# Patient Record
Sex: MALE | Employment: FULL TIME
[De-identification: names, ages, dates, MRNs, and addresses within clinical notes are randomized per-mention and may not be internally consistent; named-entity substitution may affect disease eponyms.]

---

## 2017-09-07 ENCOUNTER — CHARTING TRANS (OUTPATIENT)
Dept: OTHER | Age: 52
End: 2017-09-07

## 2017-09-07 ENCOUNTER — HOSPITAL (OUTPATIENT)
Dept: OTHER | Age: 52
End: 2017-09-07
Attending: EMERGENCY MEDICINE

## 2017-09-07 ENCOUNTER — DIAGNOSTIC TRANS (OUTPATIENT)
Dept: OTHER | Age: 52
End: 2017-09-07

## 2017-09-07 LAB
ALBUMIN SERPL-MCNC: 3.9 GM/DL (ref 3.6–5.1)
ALBUMIN/GLOB SERPL: 1.1 {RATIO} (ref 1–2.4)
ALP SERPL-CCNC: 42 UNIT/L (ref 45–117)
ALT SERPL-CCNC: 32 UNIT/L
ANALYZER ANC (IANC): NORMAL
ANION GAP SERPL CALC-SCNC: 13 MMOL/L (ref 10–20)
AST SERPL-CCNC: 24 UNIT/L
BASOPHILS # BLD: 0 THOUSAND/MCL (ref 0–0.3)
BASOPHILS NFR BLD: 1 %
BILIRUB SERPL-MCNC: 0.5 MG/DL (ref 0.2–1)
BUN SERPL-MCNC: 20 MG/DL (ref 6–20)
BUN/CREAT SERPL: 19 (ref 7–25)
CALCIUM SERPL-MCNC: 8.6 MG/DL (ref 8.4–10.2)
CHLORIDE: 108 MMOL/L (ref 98–107)
CO2 SERPL-SCNC: 26 MMOL/L (ref 21–32)
CREAT SERPL-MCNC: 1.03 MG/DL (ref 0.67–1.17)
D DIMER PPP FEU-MCNC: <0.19 MG/L FEU
DIFFERENTIAL METHOD BLD: NORMAL
EOSINOPHIL # BLD: 0.3 THOUSAND/MCL (ref 0.1–0.5)
EOSINOPHIL NFR BLD: 5 %
ERYTHROCYTE [DISTWIDTH] IN BLOOD: 12.6 % (ref 11–15)
GLOBULIN SER-MCNC: 3.6 GM/DL (ref 2–4)
GLUCOSE SERPL-MCNC: 100 MG/DL (ref 65–99)
HEMATOCRIT: 44 % (ref 39–51)
HGB BLD-MCNC: 15.2 GM/DL (ref 13–17)
LIPASE SERPL-CCNC: 203 UNIT/L (ref 73–393)
LYMPHOCYTES # BLD: 2 THOUSAND/MCL (ref 1–4.8)
LYMPHOCYTES NFR BLD: 32 %
MCH RBC QN AUTO: 32 PG (ref 26–34)
MCHC RBC AUTO-ENTMCNC: 34.5 GM/DL (ref 32–36.5)
MCV RBC AUTO: 92.6 FL (ref 78–100)
MONOCYTES # BLD: 0.8 THOUSAND/MCL (ref 0.3–0.9)
MONOCYTES NFR BLD: 13 %
NEUTROPHILS # BLD: 3 THOUSAND/MCL (ref 1.8–7.7)
NEUTROPHILS NFR BLD: 49 %
NEUTS SEG NFR BLD: NORMAL %
PERCENT NRBC: NORMAL
PLATELET # BLD: 185 THOUSAND/MCL (ref 140–450)
POTASSIUM SERPL-SCNC: 4.1 MMOL/L (ref 3.4–5.1)
PROT SERPL-MCNC: 7.5 GM/DL (ref 6.4–8.2)
RBC # BLD: 4.75 MILLION/MCL (ref 4.5–5.9)
SODIUM SERPL-SCNC: 143 MMOL/L (ref 135–145)
TROPONIN I SERPL HS-MCNC: <0.02 NG/ML
WBC # BLD: 6.1 THOUSAND/MCL (ref 4.2–11)

## 2017-11-20 RX ORDER — BIOTIN 1 MG
1 TABLET ORAL DAILY
COMMUNITY

## 2017-11-20 RX ORDER — MULTIVIT-MIN/IRON FUM/FOLIC AC 7.5 MG-4
1 TABLET ORAL DAILY
COMMUNITY

## 2017-11-20 RX ORDER — ASCORBIC ACID 500 MG
500 TABLET ORAL DAILY
COMMUNITY

## 2017-11-22 NOTE — H&P
ORTHO SURGERY H&P  Azell Landau is a 50year old male. MRN is [id number]. Admitted: (Not on file)    CC: Right knee instability    HPI: Mr. Radha Thakkar is a 50year old male who presents complaining of right knee instability symptoms.  He reports ongoing symptoms smoked. He reports occasional alcohol use. He consumes alcohol socially. He denies any intravenous, illicit, or recreational drug use. Family History: Non-contributory. Allergies: NKDA. Report latex allergy.      Medications:     Menaquinone 7 (Vit consideration of revision ACL reconstruction with an allograft bone patellar tendon bone graft. He would also augment the anterolateral capsule with an anterolateral ligament/oblique ligament reconstruction. Risks and benefits of right knee arthroscopy, re

## 2017-11-27 ENCOUNTER — ANESTHESIA EVENT (OUTPATIENT)
Dept: SURGERY | Facility: HOSPITAL | Age: 52
End: 2017-11-27
Payer: COMMERCIAL

## 2017-11-27 ENCOUNTER — SURGERY (OUTPATIENT)
Age: 52
End: 2017-11-27

## 2017-11-27 ENCOUNTER — HOSPITAL ENCOUNTER (OUTPATIENT)
Facility: HOSPITAL | Age: 52
Setting detail: HOSPITAL OUTPATIENT SURGERY
Discharge: HOME | End: 2017-11-27
Attending: ORTHOPAEDIC SURGERY | Admitting: ORTHOPAEDIC SURGERY
Payer: COMMERCIAL

## 2017-11-27 ENCOUNTER — APPOINTMENT (OUTPATIENT)
Dept: GENERAL RADIOLOGY | Facility: HOSPITAL | Age: 52
End: 2017-11-27
Attending: ORTHOPAEDIC SURGERY
Payer: COMMERCIAL

## 2017-11-27 ENCOUNTER — ANESTHESIA (OUTPATIENT)
Dept: SURGERY | Facility: HOSPITAL | Age: 52
End: 2017-11-27
Payer: COMMERCIAL

## 2017-11-27 VITALS
BODY MASS INDEX: 28.95 KG/M2 | TEMPERATURE: 98 F | RESPIRATION RATE: 16 BRPM | HEIGHT: 68 IN | SYSTOLIC BLOOD PRESSURE: 107 MMHG | OXYGEN SATURATION: 100 % | DIASTOLIC BLOOD PRESSURE: 68 MMHG | HEART RATE: 71 BPM | WEIGHT: 191 LBS

## 2017-11-27 PROCEDURE — 0MRN4KZ REPLACEMENT OF RIGHT KNEE BURSA AND LIGAMENT WITH NONAUTOLOGOUS TISSUE SUBSTITUTE, PERCUTANEOUS ENDOSCOPIC APPROACH: ICD-10-PCS | Performed by: ORTHOPAEDIC SURGERY

## 2017-11-27 PROCEDURE — 0SBC4ZZ EXCISION OF RIGHT KNEE JOINT, PERCUTANEOUS ENDOSCOPIC APPROACH: ICD-10-PCS | Performed by: ORTHOPAEDIC SURGERY

## 2017-11-27 PROCEDURE — 76000 FLUOROSCOPY <1 HR PHYS/QHP: CPT | Performed by: ORTHOPAEDIC SURGERY

## 2017-11-27 DEVICE — SCREW ACL BIO COMP AR-1370C: Type: IMPLANTABLE DEVICE | Site: KNEE | Status: FUNCTIONAL

## 2017-11-27 RX ORDER — MORPHINE SULFATE 2 MG/ML
2 INJECTION, SOLUTION INTRAMUSCULAR; INTRAVENOUS EVERY 10 MIN PRN
Status: DISCONTINUED | OUTPATIENT
Start: 2017-11-27 | End: 2017-11-27

## 2017-11-27 RX ORDER — HYDROMORPHONE HYDROCHLORIDE 1 MG/ML
INJECTION, SOLUTION INTRAMUSCULAR; INTRAVENOUS; SUBCUTANEOUS AS NEEDED
Status: DISCONTINUED | OUTPATIENT
Start: 2017-11-27 | End: 2017-11-27 | Stop reason: SURG

## 2017-11-27 RX ORDER — SODIUM CHLORIDE, SODIUM LACTATE, POTASSIUM CHLORIDE, CALCIUM CHLORIDE 600; 310; 30; 20 MG/100ML; MG/100ML; MG/100ML; MG/100ML
INJECTION, SOLUTION INTRAVENOUS CONTINUOUS
Status: DISCONTINUED | OUTPATIENT
Start: 2017-11-27 | End: 2017-11-27

## 2017-11-27 RX ORDER — HALOPERIDOL 5 MG/ML
0.25 INJECTION INTRAMUSCULAR ONCE AS NEEDED
Status: DISCONTINUED | OUTPATIENT
Start: 2017-11-27 | End: 2017-11-27

## 2017-11-27 RX ORDER — MORPHINE SULFATE 4 MG/ML
4 INJECTION, SOLUTION INTRAMUSCULAR; INTRAVENOUS EVERY 10 MIN PRN
Status: DISCONTINUED | OUTPATIENT
Start: 2017-11-27 | End: 2017-11-27

## 2017-11-27 RX ORDER — NALOXONE HYDROCHLORIDE 0.4 MG/ML
80 INJECTION, SOLUTION INTRAMUSCULAR; INTRAVENOUS; SUBCUTANEOUS AS NEEDED
Status: DISCONTINUED | OUTPATIENT
Start: 2017-11-27 | End: 2017-11-27

## 2017-11-27 RX ORDER — FAMOTIDINE 20 MG/1
20 TABLET ORAL ONCE
Status: DISCONTINUED | OUTPATIENT
Start: 2017-11-27 | End: 2017-11-27 | Stop reason: HOSPADM

## 2017-11-27 RX ORDER — MIDAZOLAM HYDROCHLORIDE 1 MG/ML
INJECTION INTRAMUSCULAR; INTRAVENOUS AS NEEDED
Status: DISCONTINUED | OUTPATIENT
Start: 2017-11-27 | End: 2017-11-27 | Stop reason: SURG

## 2017-11-27 RX ORDER — MORPHINE SULFATE 1 MG/ML
INJECTION, SOLUTION EPIDURAL; INTRATHECAL; INTRAVENOUS AS NEEDED
Status: DISCONTINUED | OUTPATIENT
Start: 2017-11-27 | End: 2017-11-27

## 2017-11-27 RX ORDER — ACETAMINOPHEN 500 MG
1000 TABLET ORAL ONCE
Status: DISCONTINUED | OUTPATIENT
Start: 2017-11-27 | End: 2017-11-27 | Stop reason: HOSPADM

## 2017-11-27 RX ORDER — ONDANSETRON 2 MG/ML
INJECTION INTRAMUSCULAR; INTRAVENOUS AS NEEDED
Status: DISCONTINUED | OUTPATIENT
Start: 2017-11-27 | End: 2017-11-27 | Stop reason: SURG

## 2017-11-27 RX ORDER — MORPHINE SULFATE 10 MG/ML
6 INJECTION, SOLUTION INTRAMUSCULAR; INTRAVENOUS EVERY 10 MIN PRN
Status: DISCONTINUED | OUTPATIENT
Start: 2017-11-27 | End: 2017-11-27

## 2017-11-27 RX ORDER — BUPIVACAINE HYDROCHLORIDE AND EPINEPHRINE 5; 5 MG/ML; UG/ML
INJECTION, SOLUTION PERINEURAL AS NEEDED
Status: DISCONTINUED | OUTPATIENT
Start: 2017-11-27 | End: 2017-11-27

## 2017-11-27 RX ORDER — HYDROCODONE BITARTRATE AND ACETAMINOPHEN 5; 325 MG/1; MG/1
1 TABLET ORAL AS NEEDED
Status: DISCONTINUED | OUTPATIENT
Start: 2017-11-27 | End: 2017-11-27

## 2017-11-27 RX ORDER — HYDROMORPHONE HYDROCHLORIDE 1 MG/ML
0.6 INJECTION, SOLUTION INTRAMUSCULAR; INTRAVENOUS; SUBCUTANEOUS EVERY 5 MIN PRN
Status: DISCONTINUED | OUTPATIENT
Start: 2017-11-27 | End: 2017-11-27

## 2017-11-27 RX ORDER — LIDOCAINE HYDROCHLORIDE 10 MG/ML
INJECTION, SOLUTION EPIDURAL; INFILTRATION; INTRACAUDAL; PERINEURAL AS NEEDED
Status: DISCONTINUED | OUTPATIENT
Start: 2017-11-27 | End: 2017-11-27 | Stop reason: SURG

## 2017-11-27 RX ORDER — HYDROCODONE BITARTRATE AND ACETAMINOPHEN 5; 325 MG/1; MG/1
2 TABLET ORAL AS NEEDED
Status: DISCONTINUED | OUTPATIENT
Start: 2017-11-27 | End: 2017-11-27

## 2017-11-27 RX ORDER — HYDROMORPHONE HYDROCHLORIDE 1 MG/ML
0.4 INJECTION, SOLUTION INTRAMUSCULAR; INTRAVENOUS; SUBCUTANEOUS EVERY 5 MIN PRN
Status: DISCONTINUED | OUTPATIENT
Start: 2017-11-27 | End: 2017-11-27

## 2017-11-27 RX ORDER — ONDANSETRON 2 MG/ML
4 INJECTION INTRAMUSCULAR; INTRAVENOUS ONCE AS NEEDED
Status: DISCONTINUED | OUTPATIENT
Start: 2017-11-27 | End: 2017-11-27

## 2017-11-27 RX ORDER — METOCLOPRAMIDE 10 MG/1
10 TABLET ORAL ONCE
Status: DISCONTINUED | OUTPATIENT
Start: 2017-11-27 | End: 2017-11-27 | Stop reason: HOSPADM

## 2017-11-27 RX ORDER — DEXAMETHASONE SODIUM PHOSPHATE 4 MG/ML
VIAL (ML) INJECTION AS NEEDED
Status: DISCONTINUED | OUTPATIENT
Start: 2017-11-27 | End: 2017-11-27 | Stop reason: SURG

## 2017-11-27 RX ORDER — HYDROMORPHONE HYDROCHLORIDE 1 MG/ML
0.2 INJECTION, SOLUTION INTRAMUSCULAR; INTRAVENOUS; SUBCUTANEOUS EVERY 5 MIN PRN
Status: DISCONTINUED | OUTPATIENT
Start: 2017-11-27 | End: 2017-11-27

## 2017-11-27 RX ORDER — HYDROCODONE BITARTRATE AND ACETAMINOPHEN 5; 325 MG/1; MG/1
1 TABLET ORAL EVERY 4 HOURS PRN
Qty: 50 TABLET | Refills: 0 | Status: SHIPPED | OUTPATIENT
Start: 2017-11-27 | End: 2018-02-22

## 2017-11-27 RX ADMIN — HYDROMORPHONE HYDROCHLORIDE 0.25 MG: 1 INJECTION, SOLUTION INTRAMUSCULAR; INTRAVENOUS; SUBCUTANEOUS at 12:10:00

## 2017-11-27 RX ADMIN — SODIUM CHLORIDE, SODIUM LACTATE, POTASSIUM CHLORIDE, CALCIUM CHLORIDE: 600; 310; 30; 20 INJECTION, SOLUTION INTRAVENOUS at 10:45:00

## 2017-11-27 RX ADMIN — LIDOCAINE HYDROCHLORIDE 50 MG: 10 INJECTION, SOLUTION EPIDURAL; INFILTRATION; INTRACAUDAL; PERINEURAL at 09:42:00

## 2017-11-27 RX ADMIN — DEXAMETHASONE SODIUM PHOSPHATE 4 MG: 4 MG/ML VIAL (ML) INJECTION at 09:42:00

## 2017-11-27 RX ADMIN — SODIUM CHLORIDE, SODIUM LACTATE, POTASSIUM CHLORIDE, CALCIUM CHLORIDE: 600; 310; 30; 20 INJECTION, SOLUTION INTRAVENOUS at 11:58:00

## 2017-11-27 RX ADMIN — SODIUM CHLORIDE, SODIUM LACTATE, POTASSIUM CHLORIDE, CALCIUM CHLORIDE: 600; 310; 30; 20 INJECTION, SOLUTION INTRAVENOUS at 09:42:00

## 2017-11-27 RX ADMIN — ONDANSETRON 4 MG: 2 INJECTION INTRAMUSCULAR; INTRAVENOUS at 12:12:00

## 2017-11-27 RX ADMIN — HYDROMORPHONE HYDROCHLORIDE 0.5 MG: 1 INJECTION, SOLUTION INTRAMUSCULAR; INTRAVENOUS; SUBCUTANEOUS at 10:17:00

## 2017-11-27 RX ADMIN — MIDAZOLAM HYDROCHLORIDE 2 MG: 1 INJECTION INTRAMUSCULAR; INTRAVENOUS at 09:42:00

## 2017-11-27 RX ADMIN — HYDROMORPHONE HYDROCHLORIDE 0.25 MG: 1 INJECTION, SOLUTION INTRAMUSCULAR; INTRAVENOUS; SUBCUTANEOUS at 10:30:00

## 2017-11-27 NOTE — ANESTHESIA PREPROCEDURE EVALUATION
Anesthesia PreOp Note    HPI:     Darryle Bracken is a 50year old male who presents for preoperative consultation requested by: Ruddy Rawls MD    Date of Surgery: 11/27/2017    Procedure(s):  KNEE ARTHROSCOPY ACL RECONSTRUCTION  Indication: Right kne reviewed. No pertinent family history.     Social History  Social History   Marital status:   Spouse name: N/A    Years of education: N/A  Number of children: N/A     Occupational History  None on file     Social History Main Topics   Smoking status:

## 2017-11-27 NOTE — OPERATIVE REPORT
[facility]    PATIENT'S NAME: Pamela Buckley   ATTENDING PHYSICIAN: Isai Narayanan MD   OPERATING PHYSICIAN: Isai Narayanan MD   PATIENT ACCOUNT#:   [id number]    LOCATION:  [place] [address]  MEDICAL RECORD #:   [id number]       DATE degenerative change of the femoral trochlea with partial thickness articular cartilage loss and some irregularity fibrillation throughout. The patellar articular surface had grade 1 to grade 2 degenerative change with softening and minimal fibrillation. the ACL remnant was performed with a motorized bur and motorized shaver. A minimal bony notchplasty was performed. We removed approximately 1 mm of the lateral wall and roof.   We then used a motorized shaver to remove remnants of the ACL graft from the f bone dowel colinear with the remaining proximal tibial articular surface. The excess graft was removed from the tibial side, and residual graft was impacted at the site of the interference screw.   The fascial layer was then repaired over the bone graft si

## 2017-11-27 NOTE — OPERATIVE REPORT
Operative Note    Patient Name: Yesy Alvarez    Preoperative Diagnosis: Right knee anterior cruciate ligament insufficiency     Postoperative Diagnosis: Right knee anterior cruciate ligament insufficiency     Primary Surgeon: Kelton Aase, MD Demaris Poling

## 2018-02-22 NOTE — H&P
ORTHO SURGERY H&P  Dianna Veloz is a 50year old male. MRN is [id number]. Admitted: (Not on file)    CC: Right knee instability    HPI: Mr. Karson Miner is a 50year old male who presents to the office complaining of right knee instability.  He is now nearly 3 m He reports occasional alcohol use. He consumes alcohol socially. He denies any intravenous, illicit, or recreational drug use. Family History: Non-contributory. Allergies: NKDA. Reports latex allergy.      Medications:     Menaquinone 7 (Vitamin K2) date of the procedure.      King Wells PA-C

## 2018-02-26 ENCOUNTER — ANESTHESIA (OUTPATIENT)
Dept: SURGERY | Facility: HOSPITAL | Age: 53
End: 2018-02-26
Payer: COMMERCIAL

## 2018-02-26 ENCOUNTER — SURGERY (OUTPATIENT)
Age: 53
End: 2018-02-26

## 2018-02-26 ENCOUNTER — ANESTHESIA EVENT (OUTPATIENT)
Dept: SURGERY | Facility: HOSPITAL | Age: 53
End: 2018-02-26
Payer: COMMERCIAL

## 2018-02-26 ENCOUNTER — HOSPITAL ENCOUNTER (OUTPATIENT)
Facility: HOSPITAL | Age: 53
Setting detail: HOSPITAL OUTPATIENT SURGERY
Discharge: HOME | End: 2018-02-26
Attending: ORTHOPAEDIC SURGERY | Admitting: ORTHOPAEDIC SURGERY
Payer: COMMERCIAL

## 2018-02-26 VITALS
RESPIRATION RATE: 13 BRPM | BODY MASS INDEX: 28.64 KG/M2 | WEIGHT: 189 LBS | DIASTOLIC BLOOD PRESSURE: 72 MMHG | HEIGHT: 68 IN | HEART RATE: 84 BPM | SYSTOLIC BLOOD PRESSURE: 118 MMHG | OXYGEN SATURATION: 98 % | TEMPERATURE: 98 F

## 2018-02-26 DIAGNOSIS — T84.89XD ACL GRAFT TEAR, SUBSEQUENT ENCOUNTER: Primary | ICD-10-CM

## 2018-02-26 PROCEDURE — 0MUN0KZ SUPPLEMENT RIGHT KNEE BURSA AND LIGAMENT WITH NONAUTOLOGOUS TISSUE SUBSTITUTE, OPEN APPROACH: ICD-10-PCS | Performed by: ORTHOPAEDIC SURGERY

## 2018-02-26 PROCEDURE — 94010 BREATHING CAPACITY TEST: CPT | Performed by: ORTHOPAEDIC SURGERY

## 2018-02-26 PROCEDURE — 0SBC4ZZ EXCISION OF RIGHT KNEE JOINT, PERCUTANEOUS ENDOSCOPIC APPROACH: ICD-10-PCS | Performed by: ORTHOPAEDIC SURGERY

## 2018-02-26 DEVICE — IMPLANTABLE DEVICE: Type: IMPLANTABLE DEVICE | Site: KNEE | Status: FUNCTIONAL

## 2018-02-26 DEVICE — TENDON ACHILLES IRR ALLOS: Type: IMPLANTABLE DEVICE | Site: KNEE | Status: FUNCTIONAL

## 2018-02-26 DEVICE — DEVICE FX TGHTRP BN TNDN BN: Type: IMPLANTABLE DEVICE | Site: KNEE | Status: FUNCTIONAL

## 2018-02-26 RX ORDER — LIDOCAINE HYDROCHLORIDE 10 MG/ML
INJECTION, SOLUTION EPIDURAL; INFILTRATION; INTRACAUDAL; PERINEURAL AS NEEDED
Status: DISCONTINUED | OUTPATIENT
Start: 2018-02-26 | End: 2018-02-26 | Stop reason: SURG

## 2018-02-26 RX ORDER — DEXAMETHASONE SODIUM PHOSPHATE 4 MG/ML
VIAL (ML) INJECTION AS NEEDED
Status: DISCONTINUED | OUTPATIENT
Start: 2018-02-26 | End: 2018-02-26 | Stop reason: SURG

## 2018-02-26 RX ORDER — METOCLOPRAMIDE 10 MG/1
10 TABLET ORAL ONCE
Status: DISCONTINUED | OUTPATIENT
Start: 2018-02-26 | End: 2018-02-26 | Stop reason: HOSPADM

## 2018-02-26 RX ORDER — KETOROLAC TROMETHAMINE 30 MG/ML
INJECTION, SOLUTION INTRAMUSCULAR; INTRAVENOUS AS NEEDED
Status: DISCONTINUED | OUTPATIENT
Start: 2018-02-26 | End: 2018-02-26 | Stop reason: SURG

## 2018-02-26 RX ORDER — BUPIVACAINE HYDROCHLORIDE AND EPINEPHRINE 5; 5 MG/ML; UG/ML
INJECTION, SOLUTION PERINEURAL AS NEEDED
Status: DISCONTINUED | OUTPATIENT
Start: 2018-02-26 | End: 2018-02-26 | Stop reason: HOSPADM

## 2018-02-26 RX ORDER — MIDAZOLAM HYDROCHLORIDE 1 MG/ML
INJECTION INTRAMUSCULAR; INTRAVENOUS AS NEEDED
Status: DISCONTINUED | OUTPATIENT
Start: 2018-02-26 | End: 2018-02-26 | Stop reason: SURG

## 2018-02-26 RX ORDER — SODIUM CHLORIDE, SODIUM LACTATE, POTASSIUM CHLORIDE, CALCIUM CHLORIDE 600; 310; 30; 20 MG/100ML; MG/100ML; MG/100ML; MG/100ML
INJECTION, SOLUTION INTRAVENOUS CONTINUOUS
Status: DISCONTINUED | OUTPATIENT
Start: 2018-02-26 | End: 2018-02-26

## 2018-02-26 RX ORDER — OXYCODONE HYDROCHLORIDE AND ACETAMINOPHEN 5; 325 MG/1; MG/1
1-2 TABLET ORAL EVERY 4 HOURS PRN
Qty: 30 TABLET | Refills: 0 | Status: SHIPPED | OUTPATIENT
Start: 2018-02-26

## 2018-02-26 RX ORDER — SCOLOPAMINE TRANSDERMAL SYSTEM 1 MG/1
1 PATCH, EXTENDED RELEASE TRANSDERMAL
Status: DISCONTINUED | OUTPATIENT
Start: 2018-02-26 | End: 2018-03-01 | Stop reason: HOSPADM

## 2018-02-26 RX ORDER — ONDANSETRON 2 MG/ML
4 INJECTION INTRAMUSCULAR; INTRAVENOUS ONCE AS NEEDED
Status: DISCONTINUED | OUTPATIENT
Start: 2018-02-26 | End: 2018-02-26

## 2018-02-26 RX ORDER — NALOXONE HYDROCHLORIDE 0.4 MG/ML
80 INJECTION, SOLUTION INTRAMUSCULAR; INTRAVENOUS; SUBCUTANEOUS AS NEEDED
Status: DISCONTINUED | OUTPATIENT
Start: 2018-02-26 | End: 2018-02-26

## 2018-02-26 RX ORDER — ACETAMINOPHEN 500 MG
1000 TABLET ORAL ONCE
Status: DISCONTINUED | OUTPATIENT
Start: 2018-02-26 | End: 2018-02-26 | Stop reason: HOSPADM

## 2018-02-26 RX ORDER — FAMOTIDINE 20 MG/1
20 TABLET ORAL ONCE
Status: DISCONTINUED | OUTPATIENT
Start: 2018-02-26 | End: 2018-02-26 | Stop reason: HOSPADM

## 2018-02-26 RX ORDER — CEFAZOLIN SODIUM/WATER 2 G/20 ML
2 SYRINGE (ML) INTRAVENOUS ONCE
Status: COMPLETED | OUTPATIENT
Start: 2018-02-26 | End: 2018-02-26

## 2018-02-26 RX ORDER — ONDANSETRON 2 MG/ML
INJECTION INTRAMUSCULAR; INTRAVENOUS AS NEEDED
Status: DISCONTINUED | OUTPATIENT
Start: 2018-02-26 | End: 2018-02-26 | Stop reason: SURG

## 2018-02-26 RX ADMIN — MIDAZOLAM HYDROCHLORIDE 2 MG: 1 INJECTION INTRAMUSCULAR; INTRAVENOUS at 16:15:00

## 2018-02-26 RX ADMIN — ONDANSETRON 4 MG: 2 INJECTION INTRAMUSCULAR; INTRAVENOUS at 18:05:00

## 2018-02-26 RX ADMIN — SODIUM CHLORIDE, SODIUM LACTATE, POTASSIUM CHLORIDE, CALCIUM CHLORIDE: 600; 310; 30; 20 INJECTION, SOLUTION INTRAVENOUS at 18:55:00

## 2018-02-26 RX ADMIN — SODIUM CHLORIDE, SODIUM LACTATE, POTASSIUM CHLORIDE, CALCIUM CHLORIDE: 600; 310; 30; 20 INJECTION, SOLUTION INTRAVENOUS at 16:15:00

## 2018-02-26 RX ADMIN — DEXAMETHASONE SODIUM PHOSPHATE 4 MG: 4 MG/ML VIAL (ML) INJECTION at 16:24:00

## 2018-02-26 RX ADMIN — LIDOCAINE HYDROCHLORIDE 50 MG: 10 INJECTION, SOLUTION EPIDURAL; INFILTRATION; INTRACAUDAL; PERINEURAL at 16:19:00

## 2018-02-26 RX ADMIN — KETOROLAC TROMETHAMINE 30 MG: 30 INJECTION, SOLUTION INTRAMUSCULAR; INTRAVENOUS at 18:45:00

## 2018-02-26 RX ADMIN — CEFAZOLIN SODIUM/WATER 2 G: 2 G/20 ML SYRINGE (ML) INTRAVENOUS at 16:29:00

## 2018-02-26 NOTE — ANESTHESIA PREPROCEDURE EVALUATION
Anesthesia PreOp Note    HPI:     Alex Godinez is a 50year old male who presents for preoperative consultation requested by: Herrera Poe MD    Date of Surgery: 2/26/2018    Procedure(s):  KNEE ARTHROSCOPY ACL RECONSTRUCTION ALLOGRAFT  Indication Comment:Skin irritation  Hydrocodone             Nausea and vomiting    Comment:Headaches/Nausea and vomiting for 3 days  Morphine                Nausea and vomiting    Comment:Headaches and vomiting for 3 days    Family History   Problem Relation Age Discussed With:  Patient  Discussed plan with:  CRNA      I have informed Aleisha Messi  of the nature of the anesthetic plan, benefits, risks, major complications, and any alternative forms of anesthetic management.    All of the patient's questions were

## 2018-02-27 NOTE — DISCHARGE SUMMARY
Pt underwent outpatient procedure without complications. Details of encounter thoroughly documented in record.

## 2018-02-27 NOTE — OPERATIVE REPORT
Operative Note    Patient Name: Yesika Fair    Preoperative Diagnosis: right knee anterior cruciate ligament recurrent tear    Postoperative Diagnosis: right knee anterior cruciate ligament recurrent tear, medial and lateral meniscus tears    Primary S

## 2018-02-27 NOTE — OPERATIVE REPORT
[facility]    PATIENT'S NAME: Maryburce Boeck   ATTENDING PHYSICIAN: Isai Parham MD   OPERATING PHYSICIAN: Isai Parham MD   PATIENT ACCOUNT#:   [id number]    LOCATION:  [facility] 10  MEDICAL RECORD #:   [id number]       KASSANDRA findings:    1. Patellofemoral joint: There were grade 1 to grade 2 degenerative changes of the patellofemoral joint with central positioning of the patella in the trochlear groove. No partial or full-thickness articular cartilage defects were noted.   2 and opened. A camera was inserted into the knee joint and a thorough examination of the joint was performed. The findings were as stated. Next, a superolateral outflow cannula was inserted.   Next, attention was turned to the medial and lateral meniscus After flipping the button, we tensioned the bone plug into the femoral socket or femoral tunnel over a distance of 20 mm.   We then placed a 7 x 20 mm [employer] interference screw anterosuperior to place more of the fibers posterior in the tunnel anesthesia was reversed. He was extubated and taken to the recovery room in stable condition. All sponge and instrument counts were reported as correct.   The attending physician, Dr. Priscilla Shay, was present and performed all critical portions of the proced

## 2018-02-27 NOTE — ANESTHESIA POSTPROCEDURE EVALUATION
Patient: Frank Coffey    Procedure Summary     Date:  02/26/18 Room / Location:  [address] [place] [address] OR 05 / [address] [place] [address] OR    Anesthesia Start:  9528 Anesthesia Stop:  1916    Procedure:  KNEE ARTHROSCOPY ACL RECONSTRUCTION ALLOGRAFT (Right Knee) Diagnosis:  (right

## 2018-08-23 NOTE — ANESTHESIA POSTPROCEDURE EVALUATION
Patient: Guillaume Bledsoe    Procedure Summary     Date:  11/27/17 Room / Location:  [address] [place] [address] MAIN OR 05 / [address] [facility] MAIN OR    Anesthesia Start:  0940 Anesthesia Stop:      Procedure:  KNEE ARTHROSCOPY ACL RECONSTRUCTION (Right Knee) Diagnosis:  (Right knee anterior cr
Alert and oriented to person, place, time/situation. normal mood and affect. no apparent risk to self or others.

## 2019-07-22 ENCOUNTER — OFFICE VISIT (OUTPATIENT)
Dept: PHYSICAL THERAPY | Age: 54
End: 2019-07-22
Attending: UROLOGY
Payer: COMMERCIAL

## 2019-07-22 ENCOUNTER — ORDER TRANSCRIPTION (OUTPATIENT)
Dept: PHYSICAL THERAPY | Age: 54
End: 2019-07-22

## 2019-07-22 DIAGNOSIS — N50.819 ORCHIALGIA: ICD-10-CM

## 2019-07-22 DIAGNOSIS — N50.819 ORCHIALGIA: Primary | ICD-10-CM

## 2019-07-22 DIAGNOSIS — N41.9 PROSTATITIS: ICD-10-CM

## 2019-07-22 PROCEDURE — 97535 SELF CARE MNGMENT TRAINING: CPT

## 2019-07-22 PROCEDURE — 97112 NEUROMUSCULAR REEDUCATION: CPT

## 2019-07-22 PROCEDURE — 97161 PT EVAL LOW COMPLEX 20 MIN: CPT

## 2019-07-22 NOTE — PROGRESS NOTES
PELVIC FLOOR EVALUATION:   Referring Physician: Dr. Josselyn Tipton  Diagnosis: testicular pain; inflammatory state of prostate  Date of Onset:chronic Date of Service: 7/22/19     PATIENT SUMMARY   Patient is a 48year old y/o male who presents to therapy today (during), breathing    Patient describes prior level of function not limitation.     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  Occupational Activities: sitting, standing and walking  Equipment Currently Using: none  Pertaining Imaging: none  FOTO outcome score: 13% imp weakness, numbness and tingling no    Depression no    Recent Infection no      Past Medical History Yes/No Comments   Surgeries yes Hernias, appendix,    Smoker no    Drinker yes    Diabetes no    Hypertension no    Hypercholestemia no    thyroid no Location/Surgery: NA       SPECIAL TESTS  L LE shorter    FUNCTIONAL PERFORMANCE     Left Right Comments    Motor Control Motor Control    Double Leg Squat WNL WNL    Single Leg Squat WNL WNL    Single Leg Balance 30 sec 30 sec        PELVIC EXAMINATION  V letter via fax as soon as possible to [phone]. If you have any questions, please contact me at Dept: [phone]    Sincerely,  Electronically signed by:    Therapist: Nash Mistry PT, DPT    [de-identified] certification required: Yes  I certify the n

## 2019-07-29 ENCOUNTER — OFFICE VISIT (OUTPATIENT)
Dept: PHYSICAL THERAPY | Age: 54
End: 2019-07-29
Attending: UROLOGY
Payer: COMMERCIAL

## 2019-07-29 PROCEDURE — 97140 MANUAL THERAPY 1/> REGIONS: CPT

## 2019-07-29 NOTE — PROGRESS NOTES
Dx: testicular pain        Insurance (Authorized # of Visits):  BC[employer] PPO 10 per POC        Authorizing Physician: Dr. Allyn Blank  Next MD visit: none scheduled  Fall Risk: standard         Precautions: n/a             Subjective: Feeling about the same.  Doing

## 2019-08-05 ENCOUNTER — APPOINTMENT (OUTPATIENT)
Dept: PHYSICAL THERAPY | Age: 54
End: 2019-08-05
Attending: UROLOGY
Payer: COMMERCIAL

## 2019-08-19 ENCOUNTER — APPOINTMENT (OUTPATIENT)
Dept: PHYSICAL THERAPY | Age: 54
End: 2019-08-19
Attending: UROLOGY
Payer: COMMERCIAL

## 2019-08-29 ENCOUNTER — APPOINTMENT (OUTPATIENT)
Dept: PHYSICAL THERAPY | Age: 54
End: 2019-08-29
Attending: UROLOGY
Payer: COMMERCIAL

## 2019-09-09 ENCOUNTER — APPOINTMENT (OUTPATIENT)
Dept: PHYSICAL THERAPY | Age: 54
End: 2019-09-09
Attending: UROLOGY
Payer: COMMERCIAL

## 2023-10-14 PROBLEM — M25.9 DISORDER OF KNEE: Status: ACTIVE | Noted: 2023-10-14

## 2023-10-14 PROBLEM — M99.05 PELVIC SOMATIC DYSFUNCTION: Status: ACTIVE | Noted: 2023-10-14

## 2023-10-14 PROBLEM — M19.019 ARTHROPATHY OF SHOULDER REGION: Status: ACTIVE | Noted: 2023-10-14

## 2023-10-14 PROBLEM — R63.8 INCREASED BODY MASS INDEX (BMI): Status: ACTIVE | Noted: 2023-10-14

## 2023-10-14 PROBLEM — R51.9 ACHING HEADACHE: Status: ACTIVE | Noted: 2023-10-14

## 2023-10-14 PROBLEM — R20.2 PARESTHESIA OF LOWER EXTREMITY: Status: ACTIVE | Noted: 2023-10-14

## 2023-10-14 PROBLEM — M25.579 PAIN IN JOINT INVOLVING ANKLE AND FOOT: Status: ACTIVE | Noted: 2023-10-14

## 2023-10-14 PROBLEM — M72.2 PLANTAR FASCIAL FIBROMATOSIS: Status: ACTIVE | Noted: 2023-10-14

## 2023-10-14 PROBLEM — S89.90XA INJURY OF MEDIAL COLLATERAL LIGAMENT (MCL) OF KNEE: Status: ACTIVE | Noted: 2023-10-14

## 2023-10-14 PROBLEM — E78.5 DYSLIPIDEMIA: Status: ACTIVE | Noted: 2023-10-14

## 2023-10-14 PROBLEM — R10.10 PAIN OF UPPER ABDOMEN: Status: ACTIVE | Noted: 2023-10-14

## 2023-10-14 PROBLEM — N50.819 PERSISTENT PAIN IN TESTICLE: Status: ACTIVE | Noted: 2023-10-14

## 2023-10-14 PROBLEM — M19.93 SECONDARY OSTEOARTHRITIS: Status: ACTIVE | Noted: 2023-10-14

## 2023-10-14 PROBLEM — G44.229 CHRONIC TENSION HEADACHE: Status: ACTIVE | Noted: 2023-10-14

## 2023-10-14 PROBLEM — G47.9 SLEEP DISORDER: Status: ACTIVE | Noted: 2023-10-14

## 2023-10-14 PROBLEM — N20.0 CALCULUS OF KIDNEY: Status: ACTIVE | Noted: 2023-10-14

## 2023-10-14 PROBLEM — M54.2 NECK PAIN: Status: ACTIVE | Noted: 2023-10-14

## 2023-10-14 PROBLEM — S89.80XA ACUTE INJURY OF ANTERIOR CRUCIATE LIGAMENT: Status: ACTIVE | Noted: 2023-10-14

## 2023-10-14 PROBLEM — H61.20 IMPACTED CERUMEN: Status: ACTIVE | Noted: 2023-10-14

## 2023-10-14 PROBLEM — M99.02 SOMATIC DYSFUNCTION OF THORACIC REGION: Status: ACTIVE | Noted: 2023-10-14

## 2023-10-14 PROBLEM — M79.18 PAIN OF MUSCLE OF ABDOMEN: Status: ACTIVE | Noted: 2023-10-14

## 2024-08-24 PROBLEM — Z01.818 ENCOUNTER FOR OTHER PREPROCEDURAL EXAMINATION: Status: ACTIVE | Noted: 2024-08-24

## 2024-08-26 ENCOUNTER — HOSPITAL ENCOUNTER (OUTPATIENT)
Dept: LAB | Age: 59
Discharge: HOME | End: 2024-08-26

## 2024-08-26 DIAGNOSIS — Z01.818 ENCOUNTER FOR OTHER PREPROCEDURAL EXAMINATION: ICD-10-CM

## 2024-08-26 LAB
ATRIAL RATE (BPM): 61
P AXIS (DEGREES): 64
PR-INTERVAL (MSEC): 142
QRS-INTERVAL (MSEC): 88
QT-INTERVAL (MSEC): 406
QTC: 409
R AXIS (DEGREES): 18
REPORT TEXT: NORMAL
T AXIS (DEGREES): 22
VENTRICULAR RATE EKG/MIN (BPM): 61

## 2024-08-26 PROCEDURE — 93005 ELECTROCARDIOGRAM TRACING: CPT

## 2024-08-26 PROCEDURE — 93010 ELECTROCARDIOGRAM REPORT: CPT | Performed by: INTERNAL MEDICINE

## 2025-04-18 ENCOUNTER — HOSPITAL ENCOUNTER (OUTPATIENT)
Facility: HOSPITAL | Age: 60
Setting detail: OBSERVATION
Discharge: HOME | End: 2025-04-20
Attending: EMERGENCY MEDICINE | Admitting: STUDENT IN AN ORGANIZED HEALTH CARE EDUCATION/TRAINING PROGRAM
Payer: COMMERCIAL

## 2025-04-18 ENCOUNTER — APPOINTMENT (OUTPATIENT)
Dept: CT IMAGING | Facility: HOSPITAL | Age: 60
End: 2025-04-18
Attending: EMERGENCY MEDICINE
Payer: COMMERCIAL

## 2025-04-18 DIAGNOSIS — N13.2 URETERAL STONE WITH HYDRONEPHROSIS: Primary | ICD-10-CM

## 2025-04-18 LAB
ALBUMIN SERPL-MCNC: 4.7 G/DL (ref 3.2–4.8)
ALP LIVER SERPL-CCNC: 46 U/L (ref 45–117)
ALT SERPL-CCNC: 19 U/L (ref 10–49)
ANION GAP SERPL CALC-SCNC: 15 MMOL/L (ref 0–18)
AST SERPL-CCNC: 23 U/L (ref ?–34)
BASOPHILS # BLD AUTO: 0.1 X10(3) UL (ref 0–0.2)
BASOPHILS NFR BLD AUTO: 0.8 %
BILIRUB DIRECT SERPL-MCNC: 0.3 MG/DL (ref ?–0.3)
BILIRUB SERPL-MCNC: 1.2 MG/DL (ref 0.3–1.2)
BUN BLD-MCNC: 16 MG/DL (ref 9–23)
BUN/CREAT SERPL: 12.2 (ref 10–20)
CALCIUM BLD-MCNC: 9.3 MG/DL (ref 8.7–10.4)
CHLORIDE SERPL-SCNC: 104 MMOL/L (ref 98–112)
CO2 SERPL-SCNC: 21 MMOL/L (ref 21–32)
CREAT BLD-MCNC: 1.31 MG/DL (ref 0.7–1.3)
DEPRECATED RDW RBC AUTO: 39.9 FL (ref 35.1–46.3)
EGFRCR SERPLBLD CKD-EPI 2021: 63 ML/MIN/1.73M2 (ref 60–?)
EOSINOPHIL # BLD AUTO: 0.35 X10(3) UL (ref 0–0.7)
EOSINOPHIL NFR BLD AUTO: 2.9 %
ERYTHROCYTE [DISTWIDTH] IN BLOOD BY AUTOMATED COUNT: 12.1 % (ref 11–15)
GLUCOSE BLD-MCNC: 107 MG/DL (ref 70–99)
HCT VFR BLD AUTO: 41.4 % (ref 39–53)
HGB BLD-MCNC: 14.6 G/DL (ref 13–17.5)
IMM GRANULOCYTES # BLD AUTO: 0.04 X10(3) UL (ref 0–1)
IMM GRANULOCYTES NFR BLD: 0.3 %
LIPASE SERPL-CCNC: 45 U/L (ref 12–53)
LYMPHOCYTES # BLD AUTO: 3.94 X10(3) UL (ref 1–4)
LYMPHOCYTES NFR BLD AUTO: 32.7 %
MCH RBC QN AUTO: 31.8 PG (ref 26–34)
MCHC RBC AUTO-ENTMCNC: 35.3 G/DL (ref 31–37)
MCV RBC AUTO: 90.2 FL (ref 80–100)
MONOCYTES # BLD AUTO: 1.13 X10(3) UL (ref 0.1–1)
MONOCYTES NFR BLD AUTO: 9.4 %
NEUTROPHILS # BLD AUTO: 6.49 X10 (3) UL (ref 1.5–7.7)
NEUTROPHILS # BLD AUTO: 6.49 X10(3) UL (ref 1.5–7.7)
NEUTROPHILS NFR BLD AUTO: 53.9 %
OSMOLALITY SERPL CALC.SUM OF ELEC: 292 MOSM/KG (ref 275–295)
PLATELET # BLD AUTO: 217 10(3)UL (ref 150–450)
POTASSIUM SERPL-SCNC: 3.4 MMOL/L (ref 3.5–5.1)
PROT SERPL-MCNC: 7.4 G/DL (ref 5.7–8.2)
RBC # BLD AUTO: 4.59 X10(6)UL (ref 4.3–5.7)
SODIUM SERPL-SCNC: 140 MMOL/L (ref 136–145)
WBC # BLD AUTO: 12.1 X10(3) UL (ref 4–11)

## 2025-04-18 PROCEDURE — 74177 CT ABD & PELVIS W/CONTRAST: CPT | Performed by: EMERGENCY MEDICINE

## 2025-04-18 PROCEDURE — 99223 1ST HOSP IP/OBS HIGH 75: CPT | Performed by: HOSPITALIST

## 2025-04-18 RX ORDER — MORPHINE SULFATE 4 MG/ML
8 INJECTION, SOLUTION INTRAMUSCULAR; INTRAVENOUS ONCE
Status: COMPLETED | OUTPATIENT
Start: 2025-04-18 | End: 2025-04-18

## 2025-04-18 RX ORDER — ONDANSETRON 2 MG/ML
4 INJECTION INTRAMUSCULAR; INTRAVENOUS ONCE
Status: COMPLETED | OUTPATIENT
Start: 2025-04-18 | End: 2025-04-18

## 2025-04-18 RX ORDER — SODIUM CHLORIDE 9 MG/ML
INJECTION, SOLUTION INTRAVENOUS ONCE
Status: COMPLETED | OUTPATIENT
Start: 2025-04-18 | End: 2025-04-20

## 2025-04-18 RX ORDER — KETOROLAC TROMETHAMINE 15 MG/ML
15 INJECTION, SOLUTION INTRAMUSCULAR; INTRAVENOUS ONCE
Status: COMPLETED | OUTPATIENT
Start: 2025-04-18 | End: 2025-04-18

## 2025-04-19 LAB
BILIRUB UR QL: NEGATIVE
CLARITY UR: CLEAR
GLUCOSE UR-MCNC: NORMAL MG/DL
KETONES UR-MCNC: 40 MG/DL
LEUKOCYTE ESTERASE UR QL STRIP.AUTO: 75
NITRITE UR QL STRIP.AUTO: NEGATIVE
PH UR: 6 [PH] (ref 5–8)
PROT UR-MCNC: NEGATIVE MG/DL
RBC #/AREA URNS AUTO: >10 /HPF
SP GR UR STRIP: >1.03 (ref 1–1.03)
UROBILINOGEN UR STRIP-ACNC: NORMAL

## 2025-04-19 PROCEDURE — 99204 OFFICE O/P NEW MOD 45 MIN: CPT | Performed by: UROLOGY

## 2025-04-19 PROCEDURE — 74420 UROGRAPHY RTRGR +-KUB: CPT | Performed by: UROLOGY

## 2025-04-19 PROCEDURE — 99233 SBSQ HOSP IP/OBS HIGH 50: CPT | Performed by: HOSPITALIST

## 2025-04-19 RX ORDER — HEPARIN SODIUM 5000 [USP'U]/ML
5000 INJECTION, SOLUTION INTRAVENOUS; SUBCUTANEOUS EVERY 8 HOURS SCHEDULED
Status: DISCONTINUED | OUTPATIENT
Start: 2025-04-20 | End: 2025-04-20

## 2025-04-19 RX ORDER — POLYETHYLENE GLYCOL 3350 17 G/17G
17 POWDER, FOR SOLUTION ORAL DAILY PRN
Status: DISCONTINUED | OUTPATIENT
Start: 2025-04-19 | End: 2025-04-20

## 2025-04-19 RX ORDER — ACETAMINOPHEN 500 MG
500 TABLET ORAL EVERY 4 HOURS PRN
Status: DISCONTINUED | OUTPATIENT
Start: 2025-04-19 | End: 2025-04-20

## 2025-04-19 RX ORDER — SODIUM PHOSPHATE, DIBASIC AND SODIUM PHOSPHATE, MONOBASIC 7; 19 G/230ML; G/230ML
1 ENEMA RECTAL ONCE AS NEEDED
Status: DISCONTINUED | OUTPATIENT
Start: 2025-04-19 | End: 2025-04-20

## 2025-04-19 RX ORDER — HYDROMORPHONE HYDROCHLORIDE 1 MG/ML
0.2 INJECTION, SOLUTION INTRAMUSCULAR; INTRAVENOUS; SUBCUTANEOUS EVERY 2 HOUR PRN
Refills: 0 | Status: DISCONTINUED | OUTPATIENT
Start: 2025-04-19 | End: 2025-04-20

## 2025-04-19 RX ORDER — HYDROCODONE BITARTRATE AND ACETAMINOPHEN 5; 325 MG/1; MG/1
2 TABLET ORAL EVERY 4 HOURS PRN
Status: DISCONTINUED | OUTPATIENT
Start: 2025-04-19 | End: 2025-04-20

## 2025-04-19 RX ORDER — HYDROMORPHONE HYDROCHLORIDE 1 MG/ML
0.4 INJECTION, SOLUTION INTRAMUSCULAR; INTRAVENOUS; SUBCUTANEOUS EVERY 2 HOUR PRN
Refills: 0 | Status: DISCONTINUED | OUTPATIENT
Start: 2025-04-19 | End: 2025-04-20

## 2025-04-19 RX ORDER — ONDANSETRON 2 MG/ML
4 INJECTION INTRAMUSCULAR; INTRAVENOUS EVERY 6 HOURS PRN
Status: DISCONTINUED | OUTPATIENT
Start: 2025-04-19 | End: 2025-04-20

## 2025-04-19 RX ORDER — ACETAMINOPHEN 325 MG/1
650 TABLET ORAL EVERY 4 HOURS PRN
Status: DISCONTINUED | OUTPATIENT
Start: 2025-04-19 | End: 2025-04-20

## 2025-04-19 RX ORDER — HYDROMORPHONE HYDROCHLORIDE 1 MG/ML
0.8 INJECTION, SOLUTION INTRAMUSCULAR; INTRAVENOUS; SUBCUTANEOUS EVERY 2 HOUR PRN
Refills: 0 | Status: DISCONTINUED | OUTPATIENT
Start: 2025-04-19 | End: 2025-04-20

## 2025-04-19 RX ORDER — SENNOSIDES 8.6 MG
17.2 TABLET ORAL NIGHTLY PRN
Status: DISCONTINUED | OUTPATIENT
Start: 2025-04-19 | End: 2025-04-20

## 2025-04-19 RX ORDER — MORPHINE SULFATE 4 MG/ML
4 INJECTION, SOLUTION INTRAMUSCULAR; INTRAVENOUS EVERY 2 HOUR PRN
Status: DISCONTINUED | OUTPATIENT
Start: 2025-04-19 | End: 2025-04-19

## 2025-04-19 RX ORDER — SUMATRIPTAN 6 MG/.5ML
6 INJECTION, SOLUTION SUBCUTANEOUS ONCE
Status: COMPLETED | OUTPATIENT
Start: 2025-04-19 | End: 2025-04-19

## 2025-04-19 RX ORDER — PROCHLORPERAZINE EDISYLATE 5 MG/ML
5 INJECTION INTRAMUSCULAR; INTRAVENOUS EVERY 8 HOURS PRN
Status: DISCONTINUED | OUTPATIENT
Start: 2025-04-19 | End: 2025-04-20

## 2025-04-19 RX ORDER — MORPHINE SULFATE 2 MG/ML
2 INJECTION, SOLUTION INTRAMUSCULAR; INTRAVENOUS EVERY 2 HOUR PRN
Status: DISCONTINUED | OUTPATIENT
Start: 2025-04-19 | End: 2025-04-19

## 2025-04-19 RX ORDER — MORPHINE SULFATE 2 MG/ML
1 INJECTION, SOLUTION INTRAMUSCULAR; INTRAVENOUS EVERY 2 HOUR PRN
Status: DISCONTINUED | OUTPATIENT
Start: 2025-04-19 | End: 2025-04-19

## 2025-04-19 RX ORDER — HYDROCODONE BITARTRATE AND ACETAMINOPHEN 5; 325 MG/1; MG/1
1 TABLET ORAL EVERY 4 HOURS PRN
Status: DISCONTINUED | OUTPATIENT
Start: 2025-04-19 | End: 2025-04-20

## 2025-04-19 RX ORDER — BISACODYL 10 MG
10 SUPPOSITORY, RECTAL RECTAL
Status: DISCONTINUED | OUTPATIENT
Start: 2025-04-19 | End: 2025-04-20

## 2025-04-19 RX ORDER — SODIUM CHLORIDE AND POTASSIUM CHLORIDE 150; 900 MG/100ML; MG/100ML
INJECTION, SOLUTION INTRAVENOUS CONTINUOUS
Status: DISCONTINUED | OUTPATIENT
Start: 2025-04-19 | End: 2025-04-20

## 2025-04-19 RX ORDER — MORPHINE SULFATE 4 MG/ML
4 INJECTION, SOLUTION INTRAMUSCULAR; INTRAVENOUS ONCE
Status: COMPLETED | OUTPATIENT
Start: 2025-04-19 | End: 2025-04-19

## 2025-04-19 NOTE — PROGRESS NOTES
[facility]  part of [facility]  Hospitalist Progress Note     Ricardo Flores Patient Status:  Observation    6/3/1965  59 year old CSN [id number]   Location 563/563-A Attending Isaiah Flynn MD   Hosp Day # 0 PCP SHA BERTRAND MD     Assessment & Plan:   ----------------------------------  Ureterolithiasis.  Stone present at the R mid ureter.  Mild hydronephrosis.  Acute kidney injury is not present.  Pain is moderately well-controlled.  -Urology consult appreciated  -Monitor kidney function, urine output  -Pain control  -Cystoscopy:  if stone has not passed  -Antibiotics: Ceftriaxone   -IV fluids: 100  -Diet: Clear liquids    Other problems  Mild creatinine elevation  Hypokalemia  Right renal lesion, outpatient follow-up    Supplementary Documentation:   DVT Mechanical Prophylaxis:     Early ambuation  DVT Pharmacologic Prophylaxis   Medication    [START ON 2025] heparin (Porcine) 5000 UNIT/ML injection 5,000 Units                Code Status: Not on file  Bolanos: No urinary catheter in place  Bolanos Duration (in days):   Central line:    RENNY:       I personally reviewed the available laboratories, imaging including. I discussed/will discuss the case with consultants. I ordered laboratories and/or radiographic studies. I adjusted medications as detailed above.  Medical decision making high, risk is high.  Discussed with urology    Subjective:   ----------------------------------  Pain moderately well-controlled.  Some nausea.  No chest pain or shortness of breath.  Agreeable to cystoscopy tomorrow      Objective:   Chief Complaint:   Chief Complaint   Patient presents with    Abdomen/Flank Pain     ----------------------------------  Temp:  [97.2 °F (36.2 °C)-98.9 °F (37.2 °C)] 98.3 °F (36.8 °C)  Pulse:  [56-79] 61  Resp:  [16-29] 16  BP: (102-133)/(60-82) 102/62  SpO2:  [91 %-99 %] 94 %  Gen: A+Ox3.  No distress.   HEENT: NCAT, neck supple, no carotid bruit.  CV: RRR, S1S2, and  intact distal pulses. No gallop, rub, murmur.  Pulm: Effort and breath sounds normal. No distress, wheezes, rales, rhonchi.  Abd: Soft, NTND, BS normal, no mass, no HSM, no rebound/guarding.   Neuro: Normal reflexes, CN. Sensory/motor exams grossly normal deficit.   MS: No joint effusions.  No peripheral edema.  Skin: Skin is warm and dry. No rashes, erythema, diaphoresis.   Psych: Normal mood and affect. Calm, cooperative    Labs:  Lab Results   Component Value Date    HGB 14.6 04/18/2025    WBC 12.1 (H) 04/18/2025    .0 04/18/2025     04/18/2025    K 3.4 (L) 04/18/2025    CREATSERUM 1.31 (H) 04/18/2025    AST 23 04/18/2025    ALT 19 04/18/2025           Scheduled Medications[1]  PRN Medications[2]             [1]    [START ON 4/20/2025] heparin  5,000 Units Subcutaneous Q8H KIARA    cefTRIAXone  2 g Intravenous Q24H   [2]   acetaminophen    acetaminophen **OR** HYDROcodone-acetaminophen **OR** HYDROcodone-acetaminophen    melatonin    polyethylene glycol (PEG 3350)    sennosides    bisacodyl    fleet enema    ondansetron    prochlorperazine    guaiFENesin    aspirin-acetaminophen-caffeine    HYDROmorphone **OR** HYDROmorphone **OR** HYDROmorphone

## 2025-04-19 NOTE — PLAN OF CARE
Problem: Patient Centered Care  Goal: Patient preferences are identified and integrated in the patient's plan of care  Description: Interventions:- What would you like us to know as we care for you? - Provide timely, complete, and accurate information to patient/family- Incorporate patient and family knowledge, values, beliefs, and cultural backgrounds into the planning and delivery of care- Encourage patient/family to participate in care and decision-making at the level they choose- Honor patient and family perspectives and choices  Outcome: Progressing     Problem: Patient/Family Goals  Goal: Patient/Family Long Term Goal  Description: Patient's Long Term Goal: Interventions:- See additional Care Plan goals for specific interventions  Outcome: Progressing  Goal: Patient/Family Short Term Goal  Description: Patient's Short Term Goal: Interventions:- See additional Care Plan goals for specific interventions  Outcome: Progressing

## 2025-04-19 NOTE — ED PROVIDER NOTES
Patient Seen in: [facility] Emergency Department      History     Chief Complaint   Patient presents with    Abdomen/Flank Pain     Stated Complaint: Abdominal Pain    Subjective:   HPI  59-year-old male with history of appendectomy, abdominal hernias repaired with mesh multiple years ago, who presents for evaluation of abdominal pain.  Pain started shortly after he pulled something heavy up out of the ground.  He had no pain prior to this.  Pain is located at the right lower quadrant and radiates for the right flank.  He feels like something is ripped internally.  His pain is associated with nausea without vomiting.  No diarrhea or constipation.  No dysuria or hematuria.   History of Present Illness               Objective:     Past Medical History:    Calculus of kidney    Esophageal reflux    diet controlled    Sleep apnea              Past Surgical History:   Procedure Laterality Date    Arthroscopy of joint unlisted Right 2015    knee x2    Arthroscopy of joint unlisted Left     knee    Arthroscopy of joint unlisted Right     shoulder    Cystoscopy,insert ureteral stent  2012    Fracture surgery Left     clavicle                Social History     Socioeconomic History    Marital status:    Tobacco Use    Smoking status: Never    Smokeless tobacco: Never   Substance and Sexual Activity    Alcohol use: No    Drug use: No                                Physical Exam     ED Triage Vitals [04/18/25 1946]   /60   Pulse 74   Resp (!) 29   Temp 97.2 °F (36.2 °C)   Temp src Temporal   SpO2 98 %   O2 Device None (Room air)       Current Vitals:   Vital Signs  BP: 122/74  Pulse: 63  Resp: (!) 29  Temp: 97.2 °F (36.2 °C)  Temp src: Temporal  MAP (mmHg): 89    Oxygen Therapy  SpO2: 94 %  O2 Device: None (Room air)        Physical Exam  Vitals and nursing note reviewed.   Constitutional:       General: He is in acute distress.      Appearance: He is well-developed.      Comments: Cannot find position of  comfort   HENT:      Head: Normocephalic and atraumatic.   Eyes:      Extraocular Movements: Extraocular movements intact.   Cardiovascular:      Rate and Rhythm: Normal rate and regular rhythm.      Heart sounds: Normal heart sounds.   Pulmonary:      Effort: Pulmonary effort is normal.      Breath sounds: Normal breath sounds.   Abdominal:      General: There is no distension.      Palpations: Abdomen is soft.      Tenderness: There is abdominal tenderness in the right lower quadrant. There is no right CVA tenderness or left CVA tenderness.      Comments: Tender to palpation in the right lower quadrant and right flank   Genitourinary:     Penis: Normal.       Testes: Normal.   Musculoskeletal:         General: Normal range of motion.      Cervical back: Normal range of motion.   Skin:     General: Skin is warm.      Capillary Refill: Capillary refill takes less than 2 seconds.   Neurological:      Mental Status: He is alert.      Comments: No focal deficits       Ddx includes but is not limited to: renal colic/obstructive uropathy, hernia, aortic dissection, mesenteric ischemia, viscus perforation, diverticulitis                ED Course     Labs Reviewed   BASIC METABOLIC PANEL (8) - Abnormal; Notable for the following components:       Result Value    Glucose 107 (*)     Potassium 3.4 (*)     Creatinine 1.31 (*)     All other components within normal limits   CBC WITH DIFFERENTIAL WITH PLATELET - Abnormal; Notable for the following components:    WBC 12.1 (*)     Monocyte Absolute 1.13 (*)     All other components within normal limits   HEPATIC FUNCTION PANEL (7) - Normal   LIPASE - Normal   URINALYSIS WITH CULTURE REFLEX          Results            CT ABDOMEN+PELVIS(CONTRAST ONLY)(CPT=74177)  Result Date: 4/18/2025  CONCLUSION:  1. A 6 mm right ureteral calculus with mild right hydroureteronephrosis. 2. A incompletely characterized 1.6 cm exophytic right lower pole renal lesion.  Follow-up nonemergent  ultrasound recommended for further characterization.  3. 4 mm left upper pole renal calculus.    Dictated by (CST): Roger House MD on 4/18/2025 at 9:26 PM     Finalized by (CST): Roger House MD on 4/18/2025 at 9:35 PM                  MDM            Medical Decision Making  Vitals are stable in the ED though patient has significant abdominal pain.  Chemistry shows creatinine 1.31, CBC with mild nonspecific leukocytosis.  LFTs normal.  Lipase normal.  Urinalysis pending.  Patient declining straight catheterization.  Per my independent interpretation of CT and pelvis there is obstructing ureteral stone on the right side, likely the etiology of his symptoms.  Patient treated with Toradol, fentanyl, morphine but has ongoing right-sided abdominal and flank discomfort.  Will plan for admission to the hospital and further management.  Dr. Cole notified of urology consultation.  Discussed with Dr. Darden for admission.  Patient's care entrusted to oncoming physician pending urinalysis.    Problems Addressed:  Ureteral stone with hydronephrosis: complicated acute illness or injury with systemic symptoms that poses a threat to life or bodily functions    Amount and/or Complexity of Data Reviewed  Labs: ordered. Decision-making details documented in ED Course.  Radiology: ordered and independent interpretation performed. Decision-making details documented in ED Course.  Discussion of management or test interpretation with external provider(s): As above    Risk  Parenteral controlled substances.  Decision regarding hospitalization.        Disposition and Plan     Clinical Impression:  1. Ureteral stone with hydronephrosis         Disposition:  There is no disposition on file for this visit.  There is no disposition time on file for this visit.    Follow-up:  No follow-up provider specified.        Medications Prescribed:  Current Discharge Medication List          Supplementary Documentation:

## 2025-04-19 NOTE — H&P
[facility]  part of [facility]    History & Physical    Ricardo Flores Patient Status:  Emergency    6/3/1965 MRN [id number]   Location [facility] EMERGENCY DEPARTMENT Attending Ameena Beck MD   Hosp Day # 0 PCP SHA BERTRAND MD     Date:  2025  Date of Admission:  2025    History provided by:patient  Chief Complaint:     Chief Complaint   Patient presents with    Abdomen/Flank Pain       HPI:   Ricardo Flores is a(n) 59 year old male with a history of appendectomy, abdominal hernia repaired with mesh, who presents with acute RLQ abdominal pain. Earlier today, when patient lifted a heavy object, he suddenly developed pain in the RLQ abdomen and right flank. He denied fall or injury. He endorsed nausea, but no emesis or diarrhea. The pain was constant and severe. In ED, patient has normal vitals. Blood work unremarkable. CT showed a 6 mm right ureteral calculus with mild right hydroureteronephrosis, and a 4 mm left upper pole renal calculus. Fentanyl, ketorolac, morphine and Zofran given. Urology consulted.    History   Past Medical History[1]  Past Surgical History[2]  Family History[3]  Social History:  Short Social Hx on File[4]  Allergies/Medications:   Allergies: Allergies[5]  Prescriptions Prior to Admission[6]    Review of Systems:   Negative except depicted in HPI.    A comprehensive 12 point review of systems was completed.  Pertinent positives and negatives noted in the the HPI.    Physical Exam:   Vital Signs:  Blood pressure 132/81, pulse 71, temperature 97.2 °F (36.2 °C), temperature source Temporal, resp. rate (!) 29, height 5' 8\" (1.727 m), weight 188 lb (85.3 kg), SpO2 97%.     GENERAL:  The patient appeared to be in distress.  Lying in bed, comfortably.  SKIN:  Warm and hydrated  HEENT:  Head was atraumatic and normocephalic.  Eyes:  Extraocular muscles were intact.  Sclera was anicteric.  Pupils were equally reactive to light.  Ears:  There were no  lesions.  Nose:  No lesions were noted.   NECK:  Supple.  There was no JVD.   CHEST:  Symmetrical movement on inspiration  HEART:  S1 and S2 heard.  RRR   LUNGS:  Air entry was good.  No crackles or wheezes   ABDOMEN: Soft and tender.  Bowel sounds were present.  MUSCULOSKELETAL:  There was no deformity.  There was full range of motion in all the extremities.   EXTREMITIES: There was no edema, clubbing or cyanosis  NEUROLOGICAL:  There was no focal deficit.  Cranial nerves II through XII were intact.  PSYCHIATRIC: Calm and cooperative     Results:     Lab Results   Component Value Date    WBC 12.1 (H) 04/18/2025    HGB 14.6 04/18/2025    HCT 41.4 04/18/2025    .0 04/18/2025    CREATSERUM 1.31 (H) 04/18/2025    BUN 16 04/18/2025     04/18/2025    K 3.4 (L) 04/18/2025     04/18/2025    CO2 21.0 04/18/2025     (H) 04/18/2025    CA 9.3 04/18/2025    ALB 4.7 04/18/2025    ALKPHO 46 04/18/2025    BILT 1.2 04/18/2025    TP 7.4 04/18/2025    AST 23 04/18/2025    ALT 19 04/18/2025    LIP 45 04/18/2025       CT ABDOMEN+PELVIS(CONTRAST ONLY)(CPT=74177)  Result Date: 4/18/2025  CONCLUSION:  1. A 6 mm right ureteral calculus with mild right hydroureteronephrosis. 2. A incompletely characterized 1.6 cm exophytic right lower pole renal lesion.  Follow-up nonemergent ultrasound recommended for further characterization.  3. 4 mm left upper pole renal calculus.    Dictated by (CST): Roger House MD on 4/18/2025 at 9:26 PM     Finalized by (CST): Roger House MD on 4/18/2025 at 9:35 PM                Assessment/Plan:   Ricardo Flores is a 59-year-old male with a history of appendectomy, abdominal hernia repaired with mesh, who presents with acute RLQ abdominal pain.     # Nephrolithiasis  - CT showed a 6 mm right ureteral calculus with mild right hydroureteronephrosis, and a 4 mm left upper pole renal calculus.   - IVF, pain control  - Urology consulted    # Acute kidney injury  - baseline Cr around 1, up  to 1.3 on arrival  - IVF    # Hypokalemia  - K 3.4, replenish    # Right renal lesion   - CT showed a incompletely characterized 1.6 cm exophytic right lower pole renal lesion.        Diet: NPO  PT/OT: deferred  DVT ppx: heparin  Line: none  Code status: Full   Dispo: expect less than 2 midnights    MDM: high Earl Rocha MD, PhD  Message via Secure Chat  [facility] Hospitalist         [1]   Past Medical History:   Calculus of kidney    Esophageal reflux    diet controlled    Sleep apnea   [2]   Past Surgical History:  Procedure Laterality Date    Arthroscopy of joint unlisted Right 2015    knee x2    Arthroscopy of joint unlisted Left     knee    Arthroscopy of joint unlisted Right     shoulder    Cystoscopy,insert ureteral stent  2012    Fracture surgery Left     clavicle   [3]   Family History  Problem Relation Age of Onset    Hypertension Father    [4]   Social History  Socioeconomic History    Marital status:    Tobacco Use    Smoking status: Never    Smokeless tobacco: Never   Substance and Sexual Activity    Alcohol use: No    Drug use: No   [5]   Allergies  Allergen Reactions    Adhesive Tape      Skin irritation     Hydrocodone NAUSEA AND VOMITING     Headaches/Nausea and vomiting for 3 days     Morphine NAUSEA AND VOMITING     Headaches and vomiting for 3 days    [6] (Not in a hospital admission)

## 2025-04-19 NOTE — CONSULTS
[facility]  part of [facility]    Report of Consultation    Ricardo Flores Patient Status:  Observation    6/3/1965 MRN [id number]   Location [facility] 5SW/SE Attending Isaiah Flynn MD   Hosp Day # 0 PCP SHA BERTRAND MD     Date of Admission:  2025  Date of Consult:  2025   Reason for Consultation:   Right ureteral stone    History of Present Illness:   Patient is a 59 year old male who was admitted to the hospital for Ureteral stone with hydronephrosis:  59-year-old male with a chronic history of kidney stones most recently 12 years ago admitted to the emergency department yesterday with moderate to severe right sided flank pain.  No fevers chills nausea or vomiting.  In the emergency department, noncontrast CT demonstrates a 6 mm proximal right ureteral stone with mild hydronephrosis.  He has required 2 doses of morphine since then at 1 AM and again this morning at 11 AM.  Denies passing a stone.  Reports the pain at 6-7 out of 10 this morning.    Past Medical History  Past Medical History[1]    Past Surgical History  Past Surgical History[2]    Family History  Family History[3]    Social History  Pediatric History   Patient Parents    Not on file     Other Topics Concern    Not on file   Social History Narrative    Not on file           Current Medications:  Current Hospital Medications[4]  Prescriptions Prior to Admission[5]    Allergies  Allergies[6]    Review of Systems:    Pertinent items are noted in HPI.    Physical Exam:   Blood pressure 102/62, pulse 61, temperature 98.3 °F (36.8 °C), temperature source Oral, resp. rate 16, height 5' 8\" (1.727 m), weight 191 lb 4.8 oz (86.8 kg), SpO2 94%.    General appearance: alert, appears stated age, and cooperative  Chest wall: no tenderness  Abdominal: soft, non-tender; bowel sounds normal; no masses,  no organomegaly  Male genitalia: normal  Pulses: 2+ and symmetric  Skin: Skin color, texture, turgor normal. No rashes  or lesions    Results:     Laboratory Data:  Lab Results   Component Value Date    WBC 12.1 (H) 04/18/2025    HGB 14.6 04/18/2025    HCT 41.4 04/18/2025    .0 04/18/2025    CREATSERUM 1.31 (H) 04/18/2025    BUN 16 04/18/2025     04/18/2025    K 3.4 (L) 04/18/2025     04/18/2025    CO2 21.0 04/18/2025     (H) 04/18/2025    CA 9.3 04/18/2025    ALB 4.7 04/18/2025    ALKPHO 46 04/18/2025    TP 7.4 04/18/2025    AST 23 04/18/2025    ALT 19 04/18/2025         Imaging:  CT ABDOMEN+PELVIS(CONTRAST ONLY)(CPT=74177)  Result Date: 4/18/2025  CONCLUSION:  1. A 6 mm right ureteral calculus with mild right hydroureteronephrosis. 2. A incompletely characterized 1.6 cm exophytic right lower pole renal lesion.  Follow-up nonemergent ultrasound recommended for further characterization.  3. 4 mm left upper pole renal calculus.    Dictated by (CST): Roger House MD on 4/18/2025 at 9:26 PM     Finalized by (CST): Roger House MD on 4/18/2025 at 9:35 PM               Impression:     Ureteral stone with hydronephrosis          Recommendations:  Reviewed findings with patient and wife at the bedside.  Discussed the likelihood of passing a 6 mm stone.  We agreed tentatively to proceed with cystoscopy, right ureteroscopy holmium laser lithotripsy and stent placement to be done tomorrow.  Will feed the patient today and make him n.p.o. after midnight.  Will start him on empiric antibiotics pending final urine culture results.  All questions were answered.  Will proceed accordingly.    Thank you for allowing me to participate in the care of your patient.    Ken Cole MD  4/19/2025         [1]   Past Medical History:   Arrhythmia    Calculus of kidney    Esophageal reflux    diet controlled    Sleep apnea   [2]   Past Surgical History:  Procedure Laterality Date    Appendectomy      Arthroscopy of joint unlisted Right 2015    knee x2    Arthroscopy of joint unlisted Left     knee    Arthroscopy of joint  unlisted Right     shoulder    Cystoscopy,insert ureteral stent  2012    Fracture surgery Left     clavicle    Hernia surgery     [3]   Family History  Problem Relation Age of Onset    Hypertension Father    [4]   Current Facility-Administered Medications   Medication Dose Route Frequency    potassium chloride 20 mEq in sodium chloride 0.9% 1000mL infusion premix   Intravenous Continuous    [START ON 4/20/2025] heparin (Porcine) 5000 UNIT/ML injection 5,000 Units  5,000 Units Subcutaneous Q8H KIARA    acetaminophen (Tylenol Extra Strength) tab 500 mg  500 mg Oral Q4H PRN    acetaminophen (Tylenol) tab 650 mg  650 mg Oral Q4H PRN    Or    HYDROcodone-acetaminophen (Norco) 5-325 MG per tab 1 tablet  1 tablet Oral Q4H PRN    Or    HYDROcodone-acetaminophen (Norco) 5-325 MG per tab 2 tablet  2 tablet Oral Q4H PRN    melatonin tab 3 mg  3 mg Oral Nightly PRN    polyethylene glycol (PEG 3350) (Miralax) 17 g oral packet 17 g  17 g Oral Daily PRN    sennosides (Senokot) tab 17.2 mg  17.2 mg Oral Nightly PRN    bisacodyl (Dulcolax) 10 MG rectal suppository 10 mg  10 mg Rectal Daily PRN    fleet enema (Fleet) rectal enema 133 mL  1 enema Rectal Once PRN    ondansetron (Zofran) 4 MG/2ML injection 4 mg  4 mg Intravenous Q6H PRN    prochlorperazine (Compazine) 10 MG/2ML injection 5 mg  5 mg Intravenous Q8H PRN    guaiFENesin (Robitussin) 100 MG/5 ML oral liquid 200 mg  200 mg Oral Q4H PRN    aspirin-acetaminophen-caffeine (Excedrin migraine) 250-250-65 MG per tab 1 tablet  1 tablet Oral Q4H PRN    potassium chloride 40 mEq in 250mL sodium chloride 0.9% IVPB premix  40 mEq Intravenous Once    SUMAtriptan (Imitrex) 6 MG/0.5ML SUBQ injection 6 mg  6 mg Subcutaneous Once    HYDROmorphone (Dilaudid) 1 MG/ML injection 0.2 mg  0.2 mg Intravenous Q2H PRN    Or    HYDROmorphone (Dilaudid) 1 MG/ML injection 0.4 mg  0.4 mg Intravenous Q2H PRN    Or    HYDROmorphone (Dilaudid) 1 MG/ML injection 0.8 mg  0.8 mg Intravenous Q2H PRN   [5]    Medications Prior to Admission   Medication Sig    Vitamin C 500 MG Oral Tab Take 1 tablet (500 mg total) by mouth in the morning.    Cholecalciferol (VITAMIN D3) 1000 units Oral Cap Take 1 tablet by mouth in the morning.    Menaquinone-7 (VITAMIN K2 OR) Take 1 tablet by mouth in the morning.    Multiple Vitamins-Minerals (MULTI-VITAMIN/MINERALS) Oral Tab Take 1 tablet by mouth in the morning.   [6]   Allergies  Allergen Reactions    Adhesive Tape      Skin irritation     Hydrocodone NAUSEA AND VOMITING     Headaches/Nausea and vomiting for 3 days     Morphine NAUSEA AND VOMITING     Headaches and vomiting for 3 days

## 2025-04-19 NOTE — ED QUICK NOTES
Pt updated on plan of care: awaiting assigned bed and RN.   Pt endorsing his pain is starting to come back and is requesting pain medication.   Dr. Lambert notified and aware.   No new orders at this time  Will continue to monitor.

## 2025-04-19 NOTE — ED QUICK NOTES
Orders for admission, patient is aware of plan and ready to go upstairs. Any questions, please call ED MARCIAL Blanco at extension [phone].     Patient Covid vaccination status: Fully vaccinated     COVID Test Ordered in ED: None    COVID Suspicion at Admission: N/A    Running Infusions: Medication Infusions[1]     Mental Status/LOC at time of transport: a&ox4    Other pertinent information:   CIWA score: N/A   NIH score:  N/A             [1]

## 2025-04-19 NOTE — RESPIRATORY THERAPY NOTE
CPAP/BIPAP EVALUATION: Done     CPAP/BIPAP INITIATION: Patient refused to use CPAP during this admission.

## 2025-04-19 NOTE — ED INITIAL ASSESSMENT (HPI)
Pt to ED with wife with c/o sudden sharp abdominal pain after  \"pulling something out of the ground\". Pt states hx of hernias. Pt poor historian in triage. Pt appears anxious and is hyperventilating. Encouraged breathing exercises. Pt yelling out in triage.

## 2025-04-20 ENCOUNTER — ANESTHESIA (OUTPATIENT)
Dept: SURGERY | Facility: HOSPITAL | Age: 60
End: 2025-04-20
Payer: COMMERCIAL

## 2025-04-20 ENCOUNTER — APPOINTMENT (OUTPATIENT)
Dept: GENERAL RADIOLOGY | Facility: HOSPITAL | Age: 60
End: 2025-04-20
Attending: UROLOGY
Payer: COMMERCIAL

## 2025-04-20 ENCOUNTER — ANESTHESIA EVENT (OUTPATIENT)
Dept: SURGERY | Facility: HOSPITAL | Age: 60
End: 2025-04-20
Payer: COMMERCIAL

## 2025-04-20 ENCOUNTER — TELEPHONE (OUTPATIENT)
Dept: SURGERY | Facility: CLINIC | Age: 60
End: 2025-04-20

## 2025-04-20 VITALS
HEART RATE: 61 BPM | SYSTOLIC BLOOD PRESSURE: 139 MMHG | HEIGHT: 68 IN | DIASTOLIC BLOOD PRESSURE: 89 MMHG | BODY MASS INDEX: 29 KG/M2 | TEMPERATURE: 98 F | WEIGHT: 191.31 LBS | OXYGEN SATURATION: 95 % | RESPIRATION RATE: 14 BRPM

## 2025-04-20 DIAGNOSIS — R82.90 ABNORMAL URINE FINDINGS: Primary | ICD-10-CM

## 2025-04-20 DIAGNOSIS — N20.1 URETERAL STONE: ICD-10-CM

## 2025-04-20 DIAGNOSIS — Z01.818 PREOPERATIVE TESTING: ICD-10-CM

## 2025-04-20 LAB
ANION GAP SERPL CALC-SCNC: 8 MMOL/L (ref 0–18)
ANION GAP SERPL CALC-SCNC: 8 MMOL/L (ref 0–18)
BASOPHILS # BLD AUTO: 0.04 X10(3) UL (ref 0–0.2)
BASOPHILS NFR BLD AUTO: 0.4 %
BUN BLD-MCNC: 12 MG/DL (ref 9–23)
BUN BLD-MCNC: 14 MG/DL (ref 9–23)
BUN/CREAT SERPL: 6.9 (ref 10–20)
BUN/CREAT SERPL: 7.9 (ref 10–20)
CALCIUM BLD-MCNC: 7.8 MG/DL (ref 8.7–10.4)
CALCIUM BLD-MCNC: 8 MG/DL (ref 8.7–10.4)
CHLORIDE SERPL-SCNC: 106 MMOL/L (ref 98–112)
CHLORIDE SERPL-SCNC: 106 MMOL/L (ref 98–112)
CO2 SERPL-SCNC: 23 MMOL/L (ref 21–32)
CO2 SERPL-SCNC: 25 MMOL/L (ref 21–32)
CREAT BLD-MCNC: 1.73 MG/DL (ref 0.7–1.3)
CREAT BLD-MCNC: 1.78 MG/DL (ref 0.7–1.3)
DEPRECATED RDW RBC AUTO: 42.2 FL (ref 35.1–46.3)
DEPRECATED RDW RBC AUTO: 42.4 FL (ref 35.1–46.3)
EGFRCR SERPLBLD CKD-EPI 2021: 44 ML/MIN/1.73M2 (ref 60–?)
EGFRCR SERPLBLD CKD-EPI 2021: 45 ML/MIN/1.73M2 (ref 60–?)
EOSINOPHIL # BLD AUTO: 0.12 X10(3) UL (ref 0–0.7)
EOSINOPHIL NFR BLD AUTO: 1.2 %
ERYTHROCYTE [DISTWIDTH] IN BLOOD BY AUTOMATED COUNT: 12.4 % (ref 11–15)
ERYTHROCYTE [DISTWIDTH] IN BLOOD BY AUTOMATED COUNT: 12.4 % (ref 11–15)
GLUCOSE BLD-MCNC: 93 MG/DL (ref 70–99)
GLUCOSE BLD-MCNC: 94 MG/DL (ref 70–99)
HCT VFR BLD AUTO: 35.8 % (ref 39–53)
HCT VFR BLD AUTO: 36.1 % (ref 39–53)
HGB BLD-MCNC: 12.4 G/DL (ref 13–17.5)
HGB BLD-MCNC: 12.4 G/DL (ref 13–17.5)
IMM GRANULOCYTES # BLD AUTO: 0.04 X10(3) UL (ref 0–1)
IMM GRANULOCYTES NFR BLD: 0.4 %
LYMPHOCYTES # BLD AUTO: 1.51 X10(3) UL (ref 1–4)
LYMPHOCYTES NFR BLD AUTO: 15.7 %
MCH RBC QN AUTO: 31.9 PG (ref 26–34)
MCH RBC QN AUTO: 32.2 PG (ref 26–34)
MCHC RBC AUTO-ENTMCNC: 34.3 G/DL (ref 31–37)
MCHC RBC AUTO-ENTMCNC: 34.6 G/DL (ref 31–37)
MCV RBC AUTO: 92.8 FL (ref 80–100)
MCV RBC AUTO: 93 FL (ref 80–100)
MONOCYTES # BLD AUTO: 1.1 X10(3) UL (ref 0.1–1)
MONOCYTES NFR BLD AUTO: 11.4 %
NEUTROPHILS # BLD AUTO: 6.83 X10 (3) UL (ref 1.5–7.7)
NEUTROPHILS # BLD AUTO: 6.83 X10(3) UL (ref 1.5–7.7)
NEUTROPHILS NFR BLD AUTO: 70.9 %
OSMOLALITY SERPL CALC.SUM OF ELEC: 284 MOSM/KG (ref 275–295)
OSMOLALITY SERPL CALC.SUM OF ELEC: 288 MOSM/KG (ref 275–295)
PLATELET # BLD AUTO: 159 10(3)UL (ref 150–450)
PLATELET # BLD AUTO: 167 10(3)UL (ref 150–450)
POTASSIUM SERPL-SCNC: 4.4 MMOL/L (ref 3.5–5.1)
RBC # BLD AUTO: 3.85 X10(6)UL (ref 4.3–5.7)
RBC # BLD AUTO: 3.89 X10(6)UL (ref 4.3–5.7)
SODIUM SERPL-SCNC: 137 MMOL/L (ref 136–145)
SODIUM SERPL-SCNC: 139 MMOL/L (ref 136–145)
WBC # BLD AUTO: 10.5 X10(3) UL (ref 4–11)
WBC # BLD AUTO: 9.6 X10(3) UL (ref 4–11)

## 2025-04-20 PROCEDURE — 99239 HOSP IP/OBS DSCHRG MGMT >30: CPT | Performed by: HOSPITALIST

## 2025-04-20 PROCEDURE — 99213 OFFICE O/P EST LOW 20 MIN: CPT | Performed by: UROLOGY

## 2025-04-20 PROCEDURE — 52332 CYSTOSCOPY AND TREATMENT: CPT | Performed by: UROLOGY

## 2025-04-20 DEVICE — URETERAL STENT WITH SIDE HOLES 6FX26CM
Type: IMPLANTABLE DEVICE | Site: URETER | Status: FUNCTIONAL
Brand: TRIA™ SOFT

## 2025-04-20 DEVICE — URETERAL STENT
Type: IMPLANTABLE DEVICE | Site: URETER | Status: FUNCTIONAL
Brand: ASCERTA™

## 2025-04-20 RX ORDER — ACETAMINOPHEN 500 MG
500 TABLET ORAL EVERY 4 HOURS PRN
Status: SHIPPED | COMMUNITY
Start: 2025-04-20 | End: 2025-04-20

## 2025-04-20 RX ORDER — MIDAZOLAM HYDROCHLORIDE 1 MG/ML
INJECTION INTRAMUSCULAR; INTRAVENOUS AS NEEDED
Status: DISCONTINUED | OUTPATIENT
Start: 2025-04-20 | End: 2025-04-20 | Stop reason: SURG

## 2025-04-20 RX ORDER — ACETAMINOPHEN 325 MG/1
650 TABLET ORAL EVERY 4 HOURS PRN
Status: SHIPPED | COMMUNITY
Start: 2025-04-20

## 2025-04-20 RX ORDER — MORPHINE SULFATE 10 MG/ML
6 INJECTION, SOLUTION INTRAMUSCULAR; INTRAVENOUS EVERY 10 MIN PRN
Status: DISCONTINUED | OUTPATIENT
Start: 2025-04-20 | End: 2025-04-20 | Stop reason: HOSPADM

## 2025-04-20 RX ORDER — NALOXONE HYDROCHLORIDE 0.4 MG/ML
80 INJECTION, SOLUTION INTRAMUSCULAR; INTRAVENOUS; SUBCUTANEOUS AS NEEDED
Status: DISCONTINUED | OUTPATIENT
Start: 2025-04-20 | End: 2025-04-20 | Stop reason: HOSPADM

## 2025-04-20 RX ORDER — IOPAMIDOL 612 MG/ML
INJECTION, SOLUTION INTRATHECAL AS NEEDED
Status: DISCONTINUED | OUTPATIENT
Start: 2025-04-20 | End: 2025-04-20 | Stop reason: HOSPADM

## 2025-04-20 RX ORDER — CEFADROXIL 500 MG/1
500 CAPSULE ORAL 2 TIMES DAILY
Qty: 6 CAPSULE | Refills: 0 | Status: SHIPPED | OUTPATIENT
Start: 2025-04-20 | End: 2025-04-28 | Stop reason: ALTCHOICE

## 2025-04-20 RX ORDER — MORPHINE SULFATE 2 MG/ML
2 INJECTION, SOLUTION INTRAMUSCULAR; INTRAVENOUS EVERY 10 MIN PRN
Status: DISCONTINUED | OUTPATIENT
Start: 2025-04-20 | End: 2025-04-20 | Stop reason: HOSPADM

## 2025-04-20 RX ORDER — HYDROMORPHONE HYDROCHLORIDE 1 MG/ML
0.2 INJECTION, SOLUTION INTRAMUSCULAR; INTRAVENOUS; SUBCUTANEOUS EVERY 5 MIN PRN
Status: DISCONTINUED | OUTPATIENT
Start: 2025-04-20 | End: 2025-04-20 | Stop reason: HOSPADM

## 2025-04-20 RX ORDER — ACETAMINOPHEN 325 MG/1
650 TABLET ORAL EVERY 4 HOURS PRN
Status: DISCONTINUED | OUTPATIENT
Start: 2025-04-20 | End: 2025-04-20

## 2025-04-20 RX ORDER — SODIUM CHLORIDE, SODIUM LACTATE, POTASSIUM CHLORIDE, CALCIUM CHLORIDE 600; 310; 30; 20 MG/100ML; MG/100ML; MG/100ML; MG/100ML
INJECTION, SOLUTION INTRAVENOUS CONTINUOUS PRN
Status: DISCONTINUED | OUTPATIENT
Start: 2025-04-20 | End: 2025-04-20 | Stop reason: SURG

## 2025-04-20 RX ORDER — MORPHINE SULFATE 4 MG/ML
4 INJECTION, SOLUTION INTRAMUSCULAR; INTRAVENOUS EVERY 10 MIN PRN
Status: DISCONTINUED | OUTPATIENT
Start: 2025-04-20 | End: 2025-04-20 | Stop reason: HOSPADM

## 2025-04-20 RX ORDER — HYDROMORPHONE HYDROCHLORIDE 1 MG/ML
0.6 INJECTION, SOLUTION INTRAMUSCULAR; INTRAVENOUS; SUBCUTANEOUS EVERY 5 MIN PRN
Status: DISCONTINUED | OUTPATIENT
Start: 2025-04-20 | End: 2025-04-20 | Stop reason: HOSPADM

## 2025-04-20 RX ORDER — TRAMADOL HYDROCHLORIDE 50 MG/1
50 TABLET ORAL EVERY 6 HOURS PRN
Qty: 10 TABLET | Refills: 0 | Status: SHIPPED | OUTPATIENT
Start: 2025-04-20 | End: 2025-04-28

## 2025-04-20 RX ORDER — HYDROMORPHONE HYDROCHLORIDE 1 MG/ML
0.4 INJECTION, SOLUTION INTRAMUSCULAR; INTRAVENOUS; SUBCUTANEOUS EVERY 5 MIN PRN
Status: DISCONTINUED | OUTPATIENT
Start: 2025-04-20 | End: 2025-04-20 | Stop reason: HOSPADM

## 2025-04-20 RX ORDER — LIDOCAINE HYDROCHLORIDE 20 MG/ML
JELLY TOPICAL AS NEEDED
Status: DISCONTINUED | OUTPATIENT
Start: 2025-04-20 | End: 2025-04-20 | Stop reason: HOSPADM

## 2025-04-20 RX ORDER — ONDANSETRON 2 MG/ML
INJECTION INTRAMUSCULAR; INTRAVENOUS AS NEEDED
Status: DISCONTINUED | OUTPATIENT
Start: 2025-04-20 | End: 2025-04-20 | Stop reason: SURG

## 2025-04-20 RX ORDER — DEXAMETHASONE SODIUM PHOSPHATE 4 MG/ML
VIAL (ML) INJECTION AS NEEDED
Status: DISCONTINUED | OUTPATIENT
Start: 2025-04-20 | End: 2025-04-20 | Stop reason: SURG

## 2025-04-20 RX ORDER — ONDANSETRON 4 MG/1
4 TABLET, ORALLY DISINTEGRATING ORAL EVERY 4 HOURS PRN
Qty: 20 TABLET | Refills: 0 | Status: SHIPPED | OUTPATIENT
Start: 2025-04-20 | End: 2025-04-28

## 2025-04-20 RX ORDER — SODIUM CHLORIDE, SODIUM LACTATE, POTASSIUM CHLORIDE, CALCIUM CHLORIDE 600; 310; 30; 20 MG/100ML; MG/100ML; MG/100ML; MG/100ML
INJECTION, SOLUTION INTRAVENOUS CONTINUOUS
Status: DISCONTINUED | OUTPATIENT
Start: 2025-04-20 | End: 2025-04-20 | Stop reason: HOSPADM

## 2025-04-20 RX ORDER — SUMATRIPTAN 6 MG/.5ML
6 INJECTION, SOLUTION SUBCUTANEOUS ONCE
Status: COMPLETED | OUTPATIENT
Start: 2025-04-20 | End: 2025-04-20

## 2025-04-20 RX ADMIN — SODIUM CHLORIDE, SODIUM LACTATE, POTASSIUM CHLORIDE, CALCIUM CHLORIDE: 600; 310; 30; 20 INJECTION, SOLUTION INTRAVENOUS at 07:34:00

## 2025-04-20 RX ADMIN — ONDANSETRON 4 MG: 2 INJECTION INTRAMUSCULAR; INTRAVENOUS at 07:49:00

## 2025-04-20 RX ADMIN — DEXAMETHASONE SODIUM PHOSPHATE 4 MG: 4 MG/ML VIAL (ML) INJECTION at 07:49:00

## 2025-04-20 RX ADMIN — MIDAZOLAM HYDROCHLORIDE 2 MG: 1 INJECTION INTRAMUSCULAR; INTRAVENOUS at 07:38:00

## 2025-04-20 NOTE — PLAN OF CARE
Pain management . Going for a procedure. NPO.  Problem: PAIN - ADULT  Goal: Verbalizes/displays adequate comfort level or patient's stated pain goal  Description: INTERVENTIONS:- Encourage pt to monitor pain and request assistance- Assess pain using appropriate pain scale- Administer analgesics based on type and severity of pain and evaluate response- Implement non-pharmacological measures as appropriate and evaluate response- Consider cultural and social influences on pain and pain management- Manage/alleviate anxiety- Utilize distraction and/or relaxation techniques- Monitor for opioid side effects- Notify MD/LIP if interventions unsuccessful or patient reports new pain- Anticipate increased pain with activity and pre-medicate as appropriate  Outcome: Progressing

## 2025-04-20 NOTE — ANESTHESIA PREPROCEDURE EVALUATION
Anesthesia PreOp Note    HPI:     Ricardo Flores is a 59 year old male who presents for preoperative consultation requested by: Ken Cole MD    Date of Surgery: 4/18/2025 - 4/20/2025    Procedure(s):  CYSTOSCOPY RIGHT RETROGRADE PYELOGRAM, POSSIBLE URETEROSCOPY, POSSIBLE LASER LITHOTRIPSY, RIGHT URETERAL STENT INSERTION  Indication: Right ureteral calculus [N20.1]    Relevant Problems   No relevant active problems       NPO:  Last Liquid Consumption Date: 04/20/25  Last Liquid Consumption Time: 0000  Last Solid Consumption Date: 04/17/25  Last Solid Consumption Time: 1700  Last Liquid Consumption Date: 04/20/25          History Review:  Patient Active Problem List    Diagnosis Date Noted    Ureteral stone with hydronephrosis 04/18/2025       Past Medical History[1]    Past Surgical History[2]    Prescriptions Prior to Admission[3]  Current Medications and Prescriptions Ordered in Epic[4]    Allergies[5]    Family History[6]  Social Hx on file[7]    Available pre-op labs reviewed.  Lab Results   Component Value Date    WBC 10.5 04/19/2025    RBC 3.85 (L) 04/19/2025    HGB 12.4 (L) 04/19/2025    HCT 35.8 (L) 04/19/2025    MCV 93.0 04/19/2025    MCH 32.2 04/19/2025    MCHC 34.6 04/19/2025    RDW 12.4 04/19/2025    .0 04/19/2025     Lab Results   Component Value Date     04/19/2025    K 4.4 04/19/2025     04/19/2025    CO2 25.0 04/19/2025    BUN 14 04/19/2025    CREATSERUM 1.78 (H) 04/19/2025    GLU 93 04/19/2025    CA 8.0 (L) 04/19/2025          Vital Signs:  Body mass index is 29.09 kg/m².   height is 1.727 m (5' 8\") and weight is 86.8 kg (191 lb 4.8 oz). His oral temperature is 98.7 °F (37.1 °C). His blood pressure is 113/71 and his pulse is 58. His respiration is 18 and oxygen saturation is 96%.   Vitals:    04/19/25 0744 04/19/25 1454 04/19/25 1958 04/20/25 0500   BP: 102/62 116/76 106/69 113/71   Pulse: 61 66 55 58   Resp: 16 16 16 18   Temp: 98.3 °F (36.8 °C) 98.3 °F (36.8 °C) 98.6 °F  (37 °C) 98.7 °F (37.1 °C)   TempSrc: Oral Oral Oral Oral   SpO2: 94% 94% 97% 96%   Weight:       Height:            Anesthesia Evaluation     Patient summary reviewed and Nursing notes reviewed    Airway   Mallampati: II  TM distance: >3 FB  Neck ROM: full  Dental      Pulmonary    (+) sleep apnea  Cardiovascular   Exercise tolerance: good    NYHA Classification: I  ECG reviewed  ROS comment: Pt saw cardiologist '24 has \" arrythmia\" no Dx no TX no need for f/u    Neuro/Psych      GI/Hepatic/Renal    (+) GERD    Endo/Other    (-) diabetes mellitus, hypothyroidism    Comments: Assessment/Plan:  Ricardo Flores is a 59-year-old male with a history of appendectomy, abdominal hernia repaired with mesh, who presents with acute RLQ abdominal pain.      # Nephrolithiasis  - CT showed a 6 mm right ureteral calculus with mild right hydroureteronephrosis, and a 4 mm left upper pole renal calculus.   - IVF, pain control  - Urology consulted     # Acute kidney injury  - baseline Cr around 1, up to 1.3 on arrival  - IVF     # Hypokalemia  - K 3.4, replenish     # Right renal lesion   - CT showed a incompletely characterized 1.6 cm exophytic right lower pole renal lesion.         Diet: NPO  PT/OT: deferred  DVT ppx: heparin  Line: none  Code status: Full   Dispo: expect less than 2 midnights     MDM: high     Abdominal pain, epigastric (e) (R10.13 - ICD-10) Active      Change in bowel habits (c) (R19.4 - ICD-10) Active      Diverticulosis of large intestine without perforation or abs (K57.30 - ICD-10) Active 12/16/2024    Dyspepsia (null - null) Active 09/14/2017    Gastritis (K29.00 - ICD-10) Active      GERD without esophagitis (e) (K21.9 - ICD-10) Active      Other specified disease of esophagus (K22.89 - ICD-10) Active 12/16/2024    Rectal bleeding (K62.5 - ICD-10) Active      Second degree hemorrhoids (K64.1 - ICD-10) Active 12/16/2024    H. pylori (B96.81 - ICD-10)         Abdominal      Other findings: 04/19/25  23:22  Glucose: 93  Sodium: 139  Potassium: 4.4  Chloride: 106  Carbon Dioxide, Total: 25.0  BUN: 14  CREATININE: 1.78 (H)  CALCIUM: 8.0 (L)  BUN/CREATININE RATIO: 7.9 (L)  EGFR: 44 (L)  ANION GAP: 8  CALCULATED OSMOLALITY: 288  WBC: 10.5  Hemoglobin: 12.4 (L)  Hematocrit: 35.8 (L)  Platelet Count: 167.0  RBC: 3.85 (L)  MCH: 32.2      (H): Data is abnormally high  (L): Data is abnormally low            Anesthesia Plan:   ASA:  2  Emergent    Plan:   General  Airway:  ETT  Plan Comments: Vomiting in pre op area   Informed Consent Plan and Risks Discussed With:  Patient  Discussed plan with:  Attending and surgeon  Provider Attestation (if preop done by other):  GA/ETT/PONV,dental damage  etc       I have informed Ricardo Flores and/or legal guardian or family member of the nature of the anesthetic plan, benefits, risks including possible dental damage if relevant, major complications, and any alternative forms of anesthetic management.   All of the patient's questions were answered to the best of my ability. The patient desires the anesthetic management as planned.  KARTHIKEYAN SANABRIA MD  4/20/2025 7:22 AM  Present on Admission:  **None**           [1]   Past Medical History:   Arrhythmia    Calculus of kidney    Esophageal reflux    diet controlled    Sleep apnea   [2]   Past Surgical History:  Procedure Laterality Date    Appendectomy      Arthroscopy of joint unlisted Right 2015    knee x2    Arthroscopy of joint unlisted Left     knee    Arthroscopy of joint unlisted Right     shoulder    Cystoscopy,insert ureteral stent  2012    Fracture surgery Left     clavicle    Hernia surgery     [3]   Medications Prior to Admission   Medication Sig Dispense Refill Last Dose/Taking    Vitamin C 500 MG Oral Tab Take 1 tablet (500 mg total) by mouth in the morning.   Past Week    Cholecalciferol (VITAMIN D3) 1000 units Oral Cap Take 1 tablet by mouth in the morning.   4/18/2025 Evening    Menaquinone-7 (VITAMIN K2 OR) Take 1 tablet  by mouth in the morning.   4/18/2025 Evening    Multiple Vitamins-Minerals (MULTI-VITAMIN/MINERALS) Oral Tab Take 1 tablet by mouth in the morning.   Unknown   [4]   Current Facility-Administered Medications Ordered in Epic   Medication Dose Route Frequency Provider Last Rate Last Admin    potassium chloride 20 mEq in sodium chloride 0.9% 1000mL infusion premix   Intravenous Continuous Earl Rocha  mL/hr at 04/20/25 0100 New Bag at 04/20/25 0100    [Transfer Hold] heparin (Porcine) 5000 UNIT/ML injection 5,000 Units  5,000 Units Subcutaneous Q8H KIARA Earl Rocha MD        [Transfer Hold] acetaminophen (Tylenol Extra Strength) tab 500 mg  500 mg Oral Q4H PRN Earl Rocha MD        [Transfer Hold] acetaminophen (Tylenol) tab 650 mg  650 mg Oral Q4H PRN Earl Rocha MD   650 mg at 04/19/25 2214    Or    [Transfer Hold] HYDROcodone-acetaminophen (Norco) 5-325 MG per tab 1 tablet  1 tablet Oral Q4H PRN Earl Rocha MD   1 tablet at 04/20/25 0007    Or    [Transfer Hold] HYDROcodone-acetaminophen (Norco) 5-325 MG per tab 2 tablet  2 tablet Oral Q4H PRN Earl Rocha MD        [Transfer Hold] melatonin tab 3 mg  3 mg Oral Nightly PRN Earl Rocha MD        [Transfer Hold] polyethylene glycol (PEG 3350) (Miralax) 17 g oral packet 17 g  17 g Oral Daily PRN Earl Rocha MD        [Transfer Hold] sennosides (Senokot) tab 17.2 mg  17.2 mg Oral Nightly PRN Earl Rocha MD        [Transfer Hold] bisacodyl (Dulcolax) 10 MG rectal suppository 10 mg  10 mg Rectal Daily PRN Earl Rcoha MD        [Transfer Hold] fleet enema (Fleet) rectal enema 133 mL  1 enema Rectal Once PRN Earl Rocha MD        [Transfer Hold] ondansetron (Zofran) 4 MG/2ML injection 4 mg  4 mg Intravenous Q6H PRN Earl Rocha MD   4 mg at 04/19/25 1107    [Transfer Hold] prochlorperazine (Compazine) 10 MG/2ML injection 5 mg  5 mg Intravenous Q8H PRN Earl Rocha MD        [Transfer Hold] guaiFENesin (Robitussin) 100 MG/5 ML oral liquid 200 mg  200 mg Oral  Q4H PRN Earl Rocha MD        [Transfer Hold] aspirin-acetaminophen-caffeine (Excedrin migraine) 250-250-65 MG per tab 1 tablet  1 tablet Oral Q4H PRN Wendi Darden MD        [Transfer Hold] HYDROmorphone (Dilaudid) 1 MG/ML injection 0.2 mg  0.2 mg Intravenous Q2H PRN Ken Cole MD        Or    [Transfer Hold] HYDROmorphone (Dilaudid) 1 MG/ML injection 0.4 mg  0.4 mg Intravenous Q2H PRN Ken Cole MD        Or    [Transfer Hold] HYDROmorphone (Dilaudid) 1 MG/ML injection 0.8 mg  0.8 mg Intravenous Q2H PRN Ken Cole MD   0.8 mg at 04/20/25 0511    cefTRIAXone (Rocephin) 2 g in sodium chloride 0.9% 100 mL IVPB-ADDV  2 g Intravenous Q24H Ken Cole  mL/hr at 04/19/25 2211 2 g at 04/19/25 2211     No current Epic-ordered outpatient medications on file.   [5]   Allergies  Allergen Reactions    Adhesive Tape      Skin irritation     Hydrocodone NAUSEA AND VOMITING     Headaches/Nausea and vomiting for 3 days     Morphine NAUSEA AND VOMITING     Headaches and vomiting for 3 days    [6]   Family History  Problem Relation Age of Onset    Hypertension Father    [7]   Social History  Socioeconomic History    Marital status:    Tobacco Use    Smoking status: Never    Smokeless tobacco: Never   Vaping Use    Vaping status: Never Used   Substance and Sexual Activity    Alcohol use: No    Drug use: No

## 2025-04-20 NOTE — TELEPHONE ENCOUNTER
Urology Surgery Scheduling Request    Location: [facility] OR    Surgeon: FRANCISCO Cole MD    Asst. Surgeon:     Diagnosis: Bilateral ureteral stones    Procedure: Cystoscopy, bilateral ureteroscopy, holmium laser lithotripsy, bilateral stent placement    Procedure CPT Code (if known): None    Anesthesia: General     Time Frame: 2 to 3 weeks    Time needed: 1.5 hours    Special Equipment: Holmium Laser    On Call to OR: Ancef (cefazolin)    Admission: Day Surgery    Pre-op Testing: CBC, BMP, EKG, and Urine Culture     Need Pre-op Clearance:     Estimated Post Op/Follow Up Appt: 1 week.  Ureteral stent removal  Ken Cole MD  4/20/2025

## 2025-04-20 NOTE — OPERATIVE REPORT
[facility]  part of [facility]    Operative Note     Ricardo Flores Location: OR   [facility] [id number] MRN [id number]   Admission Date 4/18/2025 Operation Date 4/20/2025   Attending Physician Isaiah Flynn MD Operating Physician Ken Cole MD      Preoperative Diagnosis: Right ureteral calculus [N20.1]     Postoperative Diagnosis:Bilateral ureteral calculi [N20.1]     Procedure Performed:   Cystoscopy, bilateral retrograde pyelogram, bilateral ureteral stent placements     Primary Surgeon: Ken Cole MD      Assistant: None     Surgical Findings: Grossly normal urethra and bladder.  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  Unable to place the ureteroscope.  I attempted to dilate the ureter on the right using an 8/10 coaxial dilator and an 11/13 Maldivian access sheath unsuccessfully.  As such, a stent was placed for definitive bilateral ureteroscopy at a later time.  Anesthesia: General     Complications: None     Implants:   Implant Name Type Inv. Item Serial No.  Lot No. LRB No. Used Action   STENT URO [id number] PGTL SFT HYDRPHLC COAT - SN/A  STENT URO [id number] PGTL SFT HYDRPHLC COAT N/A [employer] [id number] Left 1 Implanted   STENT URET 6FR L26CM SFT W/O GWIRE TRI LAYR - SN/A  STENT URET 6FR L26CM SFT W/O GWIRE TRI LAYR N/A [employer] [id number] Right 1 Implanted        Specimen: None     Drains: None     Condition: Stable     Estimated Blood Loss: No data recorded     Summary of Case: After consent was obtained and preoperative antibiotics administered the patient was brought into the operating room.  Anesthesia was administered and he was placed in the dorsolithotomy position.  The groin was prepped and draped in the usual sterile standard fashion.   22 Bahamian rigid cystoscope was placed per urethra.  Findings are noted in the findings section of this report.  A 6 Bahamian open-ended catheter was used to perform a retrograde pyelogram on the left side.  There was a questionable 3 mm stone in the proximal third of the left ureter.  This did not appear to move on repeated injections to be suggestive of a air bubble.  A 6 Bahamian by 26 cm stent was placed on the left side.  Attention was then directed at the right side.  The 6 Bahamian open-ended catheter was used to perform a retrograde pyelogram on the right.  There was a 7 to 8 mm clear radiopaque density on the right side.  This was in the proximal right ureter.  A sensor wire was advanced beyond the stone into the collecting system.  An 8/10 coaxial dilator was used but resistance measures met in the distal ureter but I was still able to get a second wire and.  I tried to get the "[employer]" digital flexible ureteroscope over one of the 2 wires but met resistance at the distal ureter.  I could see natural stenosis of the ureter without a clear stricture.  I removed the ureteroscope leaving the 2 wires in place.  I tried to dilate the ureter again using an 8/10 coaxial dilator as well as an 11/13 Bahamian by 36 cm access sheath using only the inner sheath unsuccessfully.  As such I decided to simply place a stent.  A 6 Bahamian by 26 cm Tria stent was placed as there were no other contour stents in that size.  The stent was deployed under fluoroscopic and cystoscopic guidance.  The bladder was emptied and the cystoscope was removed.  2% viscous lidocaine was instilled per urethra.  The patient tolerated the procedure well.  There were no perioperative or immediate postop complications.  I was present and performed the entirety of his procedure.     Ken Cole MD  4/20/2025  8:25 AM

## 2025-04-20 NOTE — ANESTHESIA POSTPROCEDURE EVALUATION
Patient: Ricardo Flores    Procedure Summary       Date: 04/20/25 Room / Location: [facility] MAIN OR  / [facility] OR; [facility] X-ray    Anesthesia Start: 0734 Anesthesia Stop:     Procedures:       CYSTOSCOPY BILATERAL RETROGRADE PYELOGRAM, URETEROSCOPY,, BILATERAL URETERAL STENT PLACEMENTS (Bilateral: Ureter)      XR OR - N/C Diagnosis:       Right ureteral calculus      (Right ureteral calculus [N20.1])      (CYSTOSCOPY RIGHT RETROGRADE PYELOGRAM, POSSIBLE URETEROSCOPY, POSSIBLE LASER LITHOTRIPSY, RIGHT URETERAL STENT INSERTION)    Scheduled Providers: Ken Cole MD Anesthesiologist: Lily Sanabria MD    Anesthesia Type: general ASA Status: 2 - Emergent            Anesthesia Type: general    Vitals Value Taken Time   /76 04/20/25 08:58   Temp 98.9 °F (37.2 °C) 04/20/25 08:58   Pulse 65 04/20/25 09:06   Resp 11 04/20/25 09:06   SpO2 94 % 04/20/25 09:08   Vitals shown include unfiled device data.    [facility] AN Post Evaluation:   Patient Evaluated in PACU  Patient Participation: complete - patient participated  Level of Consciousness: awake and alert  Pain Score: 0  Pain Management: adequate  Airway Patency:patent  Dental exam unchanged from preop  Yes    Nausea/Vomiting: none  Cardiovascular Status: stable  Respiratory Status: room air  Postoperative Hydration stable      LILY SANABRIA MD  4/20/2025 9:42 AM

## 2025-04-20 NOTE — PROGRESS NOTES
[facility]  part of [facility]    Progress Note    Ricardo Flores Patient Status:  Observation    6/3/1965 MRN [id number]   Location [facility] PRE OP RECOVERY Attending Isaiah Flynn MD   Hosp Day # 0 PCP SHA BERTRAND MD       Subjective:   Ricardo Flores is a(n) 59 year old male   Still with pain on right and last night had some left sided flank pain as well.  No fever but has had intractable nausea and vomiting. Believes related to IV morphine.  Did not improve when he was switched to Dilaudid.    Objective:   Blood pressure 113/71, pulse 58, temperature 98.7 °F (37.1 °C), temperature source Oral, resp. rate 18, height 5' 8\" (1.727 m), weight 191 lb 4.8 oz (86.8 kg), SpO2 96%.    Abd soft, NTND    Assessment and Plan:     Ureteral stone with hydronephrosis  Recommend:  -To OR for cysto, attempted right ureteroscopy, laser lithotripsy and lefty retrograde pyelogram and possible stent.  Risks and possible side effects reviewed and he understands and agrees.        Results:     Lab Results   Component Value Date    WBC 9.6 2025    HGB 12.4 (L) 2025    HCT 36.1 (L) 2025    .0 2025    CREATSERUM 1.78 (H) 2025    BUN 14 2025     2025    K 4.4 2025     2025    CO2 25.0 2025    GLU 93 2025    CA 8.0 (L) 2025    ALB 4.7 2025    ALKPHO 46 2025    AST 23 2025    ALT 19 2025         CT ABDOMEN+PELVIS(CONTRAST ONLY)(CPT=74177)  Result Date: 2025  CONCLUSION:  1. A 6 mm right ureteral calculus with mild right hydroureteronephrosis. 2. A incompletely characterized 1.6 cm exophytic right lower pole renal lesion.  Follow-up nonemergent ultrasound recommended for further characterization.  3. 4 mm left upper pole renal calculus.    Dictated by (CST): Roger House MD on 2025 at 9:26 PM     Finalized by (CST): Roger House MD on 2025 at 9:35 PM                 Ken Cole MD  4/20/2025

## 2025-04-20 NOTE — PLAN OF CARE
Problem: Patient Centered Care  Goal: Patient preferences are identified and integrated in the patient's plan of care  Description: Interventions:- What would you like us to know as we care for you? - Provide timely, complete, and accurate information to patient/family- Incorporate patient and family knowledge, values, beliefs, and cultural backgrounds into the planning and delivery of care- Encourage patient/family to participate in care and decision-making at the level they choose- Honor patient and family perspectives and choices  4/20/2025 1715 by Shaila Brantley RN  Outcome: Adequate for Discharge  4/20/2025 1328 by Shaila Brantley RN  Outcome: Progressing     Problem: Patient/Family Goals  Goal: Patient/Family Long Term Goal  Description: Patient's Long Term Goal: Interventions:- - See additional Care Plan goals for specific interventions  4/20/2025 1715 by Shaila Brantley RN  Outcome: Adequate for Discharge  4/20/2025 1328 by Shaila Brantley RN  Outcome: Progressing  Goal: Patient/Family Short Term Goal  Description: Patient's Short Term Goal: Interventions: - - See additional Care Plan goals for specific interventions  4/20/2025 1715 by Shaila Brantley RN  Outcome: Adequate for Discharge  4/20/2025 1328 by Shaila Brantley RN  Outcome: Progressing     Problem: PAIN - ADULT  Goal: Verbalizes/displays adequate comfort level or patient's stated pain goal  Description: INTERVENTIONS:- Encourage pt to monitor pain and request assistance- Assess pain using appropriate pain scale- Administer analgesics based on type and severity of pain and evaluate response- Implement non-pharmacological measures as appropriate and evaluate response- Consider cultural and social influences on pain and pain management- Manage/alleviate anxiety- Utilize distraction and/or relaxation techniques- Monitor for opioid side effects- Notify MD/LIP if interventions unsuccessful or patient reports new pain- Anticipate increased pain with activity  and pre-medicate as appropriate  4/20/2025 1715 by Shaila Brantley, MARCIAL  Outcome: Adequate for Discharge  4/20/2025 1328 by Shaila Brantley, RN  Outcome: Progressing

## 2025-04-20 NOTE — DISCHARGE SUMMARY
[facility]  part of [facility]     DISCHARGE SUMMARY     Ricardo Flores Patient Status:  Observation    6/3/1965 MRN [id number]   Location [facility] 5SW/SE Attending Isaiah Flynn MD   Hosp Day # 0 PCP SHA BERTRAND MD     DATE OF ADMISSION: 2025  DATE OF DISCHARGE:  25  DISPOSITION: home  CONDITION ON DISCHARGE: good    DISCHARGE DIAGNOSES:  Ureterolithiasis  Mild creatinine elevation  Hypokalemia  Right renal lesion, outpatient follow-up    Postoperative Diagnosis:Bilateral ureteral calculi [N20.1]     Procedure Performed:   Cystoscopy, bilateral retrograde pyelogram, bilateral ureteral stent placements    HISTORY OF PRESENT ILLNESS (COPIED FROM ADMISSION H&P)  Ricardo Flores is a(n) 59 year old male with a history of appendectomy, abdominal hernia repaired with mesh, who presents with acute RLQ abdominal pain. Earlier today, when patient lifted a heavy object, he suddenly developed pain in the RLQ abdomen and right flank. He denied fall or injury. He endorsed nausea, but no emesis or diarrhea. The pain was constant and severe. In ED, patient has normal vitals. Blood work unremarkable. CT showed a 6 mm right ureteral calculus with mild right hydroureteronephrosis, and a 4 mm left upper pole renal calculus. Fentanyl, ketorolac, morphine and Zofran given. Urology consulted.     HOSPITAL COURSE:  Patient was admitted.  Started on pain control.  Seen by urology.  He did not pass a stone and was taken to cystoscopy with bilateral stent placement.  Postoperatively the patient did have some discomfort related to the stents but no complications.  He will return to the urology office in 2-3 weeks for discussion of definitive stone management.  Will finish short course of oral antibiotics, no definitive infection identified however.    Patient understands return to the emergency room for increased pain, fever, discharge, shortness of breath, chest pain, new neurologic  symptoms, other concerning symptoms.    PHYSICAL EXAM:  Temp:  [97.6 °F (36.4 °C)-99.1 °F (37.3 °C)] 97.6 °F (36.4 °C)  Pulse:  [55-73] 61  Resp:  [12-18] 14  BP: (106-139)/(69-89) 139/89  SpO2:  [93 %-97 %] 95 %  Gen: A+Ox3.  No distress.   HEENT: NCAT, neck supple, no carotid bruit.  CV: RRR, S1S2, and intact distal pulses. No gallop, rub, murmur.  Pulm: Effort and breath sounds normal. No distress, wheezes, rales, rhonchi.  Abd: Soft, NTND, BS normal, no mass, no HSM, no rebound/guarding.   Neuro: Normal reflexes, CN. Sensory/motor exams grossly normal deficit. Coordination  and gait normal.   MS: No joint effusions.  No peripheral edema.  Skin: Skin is warm and dry. No rashes, erythema, diaphoresis.   Psych: Normal mood and affect. Behavior and judgment normal.     DISCHARGE MEDICATIONS     Discharge Medications        START taking these medications        Instructions Prescription details   acetaminophen 325 MG Tabs  Commonly known as: Tylenol      Take 2 tablets (650 mg total) by mouth every 4 (four) hours as needed.   Refills: 0     cefadroxil 500 MG Caps  Commonly known as: DURICEF      Take 1 capsule (500 mg total) by mouth 2 (two) times daily for 3 days.   Stop taking on: April 23, 2025  Quantity: 6 capsule  Refills: 0     ondansetron 4 MG Tbdp  Commonly known as: Zofran-ODT      Take 1 tablet (4 mg total) by mouth every 4 (four) hours as needed for Nausea.   Quantity: 20 tablet  Refills: 0     traMADol 50 MG Tabs  Commonly known as: Ultram      Take 1 tablet (50 mg total) by mouth every 6 (six) hours as needed for Pain.   Quantity: 10 tablet  Refills: 0            CONTINUE taking these medications        Instructions Prescription details   Multi-Vitamin/Minerals Tabs      Take 1 tablet by mouth in the morning.   Refills: 0     Vitamin C 500 MG Tabs  Commonly known as: VITAMIN C      Take 1 tablet (500 mg total) by mouth in the morning.   Refills: 0     Vitamin D3 25 MCG (1000 UT) Caps      Take 1 tablet  by mouth in the morning.   Refills: 0     VITAMIN K2 OR      Take 1 tablet by mouth in the morning.   Refills: 0               Where to Get Your Medications        These medications were sent to [id number] - [place], [place] - [address] AT [facility], [phone], [phone]  [address], [facility] [address]      Hours: 24-hours Phone: [phone]   cefadroxil 500 MG Caps  ondansetron 4 MG Tbdp  traMADol 50 MG Tabs         CONSULTANTS  Consultants         Provider   Role Specialty     Ken Cole MD      Consulting Physician UROLOGY     Wendi Darden MD      Consulting Physician Internal Medicine            FOLLOW UP:  Beata Morales MD  [address]  [facility] [address] [phone]    Follow up in 1 week(s)  Post Discharge Followup    Ken Cole MD  [address]  [facility] [id number] [phone]    Follow up in 2 week(s)  Post Discharge Followup    The above plan and follow-up instructions were reviewed with the patient and they verbalized understanding and agreement.  They understand to return to the emergency room for any concerning signs or symptoms.  Greater than 30 minutes spent on discharge.  -----------------------    Hospital Discharge Diagnoses: Ureterolithiasis    Lace+ Score: 28  59-90 High Risk  29-58 Medium Risk  0-28   Low Risk.    TCM Follow-Up Recommendation:  LACE < 29: Low Risk of readmission after discharge from the hospital. No TCM follow-up needed.  Supplementary Documentation:

## 2025-04-20 NOTE — PLAN OF CARE
Problem: Patient Centered Care  Goal: Patient preferences are identified and integrated in the patient's plan of care  Description: Interventions:- What would you like us to know as we care for you? I live home with my wife- Provide timely, complete, and accurate information to patient/family- Incorporate patient and family knowledge, values, beliefs, and cultural backgrounds into the planning and delivery of care- Encourage patient/family to participate in care and decision-making at the level they choose- Honor patient and family perspectives and choices  Outcome: Progressing     Problem: Patient/Family Goals  Goal: Patient/Family Long Term Goal  Description: Patient's Long Term Goal: Interventions:- - See additional Care Plan goals for specific interventions  Outcome: Progressing  Goal: Patient/Family Short Term Goal  Description: Patient's Short Term Goal: Interventions: - - See additional Care Plan goals for specific interventions  Outcome: Progressing     Problem: PAIN - ADULT  Goal: Verbalizes/displays adequate comfort level or patient's stated pain goal  Description: INTERVENTIONS:- Encourage pt to monitor pain and request assistance- Assess pain using appropriate pain scale- Administer analgesics based on type and severity of pain and evaluate response- Implement non-pharmacological measures as appropriate and evaluate response- Consider cultural and social influences on pain and pain management- Manage/alleviate anxiety- Utilize distraction and/or relaxation techniques- Monitor for opioid side effects- Notify MD/LIP if interventions unsuccessful or patient reports new pain- Anticipate increased pain with activity and pre-medicate as appropriate  Outcome: Progressing

## 2025-04-20 NOTE — ANESTHESIA PROCEDURE NOTES
Airway  Date/Time: 4/20/2025 7:43 AM  Reason: Elective    Airway not difficult    General Information and Staff   Patient location during procedure: OR  Anesthesiologist: Lily Mcdaniel MD  Performed: anesthesiologist   Performed by: Lily Mcdaniel MD  Authorized by: Lily Mcdaniel MD        Indications and Patient Condition  Indications for airway management: anesthesia  Sedation level: deep      Preoxygenated: yesPatient position: sniffing    Mask difficulty assessment: 0 - not attempted  Planned trial extubation    Final Airway Details    Final airway type: endotracheal airway    Successful airway: ETT  Cuffed: yes   Successful intubation technique: direct laryngoscopy  Facilitating devices/methods: intubating stylet, cricoid pressure and rapid sequence intubation  Endotracheal tube insertion site: oral  Blade size: #3  ETT size (mm): 7.5    Cormack-Lehane Classification: grade I - full view of glottis  Placement verified by: capnometry   Measured from: teeth  ETT to teeth (cm): 23  Number of attempts at approach: 1  Ventilation between attempts: none  Number of other approaches attempted: 0    Additional Comments  Pierce # 3

## 2025-04-23 ENCOUNTER — LAB ENCOUNTER (OUTPATIENT)
Dept: LAB | Age: 60
End: 2025-04-23
Attending: UROLOGY
Payer: COMMERCIAL

## 2025-04-23 ENCOUNTER — EKG ENCOUNTER (OUTPATIENT)
Dept: LAB | Age: 60
End: 2025-04-23
Attending: UROLOGY
Payer: COMMERCIAL

## 2025-04-23 DIAGNOSIS — Z01.818 PREOPERATIVE TESTING: ICD-10-CM

## 2025-04-23 DIAGNOSIS — R82.90 ABNORMAL URINE FINDINGS: ICD-10-CM

## 2025-04-23 LAB
ANION GAP SERPL CALC-SCNC: 9 MMOL/L (ref 0–18)
BASOPHILS # BLD AUTO: 0.04 X10(3) UL (ref 0–0.2)
BASOPHILS NFR BLD AUTO: 0.6 %
BUN BLD-MCNC: 15 MG/DL (ref 9–23)
BUN/CREAT SERPL: 14.3 (ref 10–20)
CALCIUM BLD-MCNC: 9.2 MG/DL (ref 8.7–10.4)
CHLORIDE SERPL-SCNC: 105 MMOL/L (ref 98–112)
CO2 SERPL-SCNC: 24 MMOL/L (ref 21–32)
CREAT BLD-MCNC: 1.05 MG/DL (ref 0.7–1.3)
DEPRECATED RDW RBC AUTO: 39.3 FL (ref 35.1–46.3)
EGFRCR SERPLBLD CKD-EPI 2021: 83 ML/MIN/1.73M2 (ref 60–?)
EOSINOPHIL # BLD AUTO: 0.25 X10(3) UL (ref 0–0.7)
EOSINOPHIL NFR BLD AUTO: 3.8 %
ERYTHROCYTE [DISTWIDTH] IN BLOOD BY AUTOMATED COUNT: 12.1 % (ref 11–15)
GLUCOSE BLD-MCNC: 111 MG/DL (ref 70–99)
HCT VFR BLD AUTO: 41.5 % (ref 39–53)
HGB BLD-MCNC: 14.6 G/DL (ref 13–17.5)
IMM GRANULOCYTES # BLD AUTO: 0.01 X10(3) UL (ref 0–1)
IMM GRANULOCYTES NFR BLD: 0.2 %
LYMPHOCYTES # BLD AUTO: 1.52 X10(3) UL (ref 1–4)
LYMPHOCYTES NFR BLD AUTO: 23.3 %
MCH RBC QN AUTO: 31.6 PG (ref 26–34)
MCHC RBC AUTO-ENTMCNC: 35.2 G/DL (ref 31–37)
MCV RBC AUTO: 89.8 FL (ref 80–100)
MONOCYTES # BLD AUTO: 0.57 X10(3) UL (ref 0.1–1)
MONOCYTES NFR BLD AUTO: 8.7 %
NEUTROPHILS # BLD AUTO: 4.14 X10 (3) UL (ref 1.5–7.7)
NEUTROPHILS # BLD AUTO: 4.14 X10(3) UL (ref 1.5–7.7)
NEUTROPHILS NFR BLD AUTO: 63.4 %
OSMOLALITY SERPL CALC.SUM OF ELEC: 288 MOSM/KG (ref 275–295)
PLATELET # BLD AUTO: 229 10(3)UL (ref 150–450)
POTASSIUM SERPL-SCNC: 4.1 MMOL/L (ref 3.5–5.1)
RBC # BLD AUTO: 4.62 X10(6)UL (ref 4.3–5.7)
SODIUM SERPL-SCNC: 138 MMOL/L (ref 136–145)
WBC # BLD AUTO: 6.5 X10(3) UL (ref 4–11)

## 2025-04-23 PROCEDURE — 85025 COMPLETE CBC W/AUTO DIFF WBC: CPT

## 2025-04-23 PROCEDURE — 93010 ELECTROCARDIOGRAM REPORT: CPT | Performed by: INTERNAL MEDICINE

## 2025-04-23 PROCEDURE — 80048 BASIC METABOLIC PNL TOTAL CA: CPT

## 2025-04-23 PROCEDURE — 87086 URINE CULTURE/COLONY COUNT: CPT

## 2025-04-23 PROCEDURE — 36415 COLL VENOUS BLD VENIPUNCTURE: CPT

## 2025-04-23 PROCEDURE — 93005 ELECTROCARDIOGRAM TRACING: CPT

## 2025-04-23 NOTE — TELEPHONE ENCOUNTER
Late entry: spoke with pt on 4/22 and scheduled surgery for 4/30/25 with Dr Cole.   Pt aware to complete preoperative labs, EKG, and urine culture today, 4/23/25.    Surgery information sent to pt thru [employer] [facility] portal.     1 wk follow up for stent removal not scheduled due to changes in MD schedule.  Pt aware will get a call from surgery scheduler to schedule the s/p appointment.  Pt appreciated follow up.

## 2025-04-25 LAB
ATRIAL RATE: 71 BPM
P AXIS: 66 DEGREES
P-R INTERVAL: 140 MS
Q-T INTERVAL: 392 MS
QRS DURATION: 82 MS
QTC CALCULATION (BEZET): 425 MS
R AXIS: 32 DEGREES
T AXIS: 19 DEGREES
VENTRICULAR RATE: 71 BPM

## 2025-04-28 RX ORDER — CHOLECALCIFEROL (VITAMIN D3) 125 MCG
1 CAPSULE ORAL DAILY
COMMUNITY

## 2025-04-28 RX ORDER — UBROGEPANT 100 MG/1
100 TABLET ORAL AS NEEDED
COMMUNITY
Start: 2023-10-06

## 2025-04-28 RX ORDER — TRETINOIN 0.5 MG/G
1 CREAM TOPICAL AS NEEDED
COMMUNITY
Start: 2024-11-20

## 2025-04-29 NOTE — DISCHARGE INSTRUCTIONS
My office will call you to set up an appointment to remove your stents in about 1 week.   Drink plenty of fluids.  You may have a burning feeling or light bleeding when you pee. This is normal.  Medicines may be prescribed to ease any mild pain or prevent infection. Take these as directed.  When to call your healthcare provider  Call your healthcare provider if you have any of these after the procedure:   Heavy bleeding or blood clots  Burning that lasts more than 1 day  Fever of  100.4° F ( 38°C ) or higher, or as directed by your provider  Trouble peeing      HOME INSTRUCTIONS  [facility] HOME CARE INSTRUCTIONS: POST-OP ANESTHESIA  The medication that you received for sedation or general anesthesia can last up to 24 hours. Your judgment and reflexes may be altered, even if you feel like your normal self.      We Recommend:   Do not drive any motor vehicle or bicycle   Avoid mowing the lawn, playing sports, or working with power tools/applicances (power saws, electric knives or mixers)   That you have someone stay with you on your first night home   Do not drink alcohol or take sleeping pills or tranquilizers   Do not sign legal documents within 24 hours of your procedure   If you had a nerve block for your surgery, take extra care not to put any pressure on your arm or hand for 24 hours    It is normal:  For you to have a sore throat if you had a breathing tube during surgery (while you were asleep!). The sore throat should get better within 48 hours. You can gargle with warm salt water (1/2 tsp in 4 oz warm water) or use a throat lozenge for comfort  To feel muscle aches or soreness especially in the abdomen, chest or neck. The achy feeling should go away in the next 24 hours  To feel weak, sleepy or \"wiped out\". Your should start feeling better in the next 24 hours.   To experience mild discomforts such as sore lip or tongue, headache, cramps, gas pains or a bloated feeling in your abdomen.   To experience mild  back pain or soreness for a day or two if you had spinal or epidural anesthesia.   If you had laparoscopic surgery, to feel shoulder pain or discomfort on the day of surgery.   For some patients to have nausea after surgery/anesthesia    If you feel nausea or experience vomiting:   Try to move around less.   Eat less than usual or drink only liquids until the next morning   Nausea should resolve in about 24 hours    If you have a problem when you are at home:    Call your surgeons office   Discharge Instructions: After Your Surgery  You’ve just had surgery. During surgery, you were given medicine called anesthesia to keep you relaxed and free of pain. After surgery, you may have some pain or nausea. This is common. Here are some tips for feeling better and getting well after surgery.   Going home  Your healthcare provider will show you how to take care of yourself when you go home. They'll also answer your questions. Have an adult family member or friend drive you home. For the first 24 hours after your surgery:   Don't drive or use heavy equipment.  Don't make important decisions or sign legal papers.  Take medicines as directed.  Don't drink alcohol.  Have someone stay with you, if needed. They can watch for problems and help keep you safe.  Be sure to go to all follow-up visits with your healthcare provider. And rest after your surgery for as long as your provider tells you to.   Coping with pain  If you have pain after surgery, pain medicine will help you feel better. Take it as directed, before pain becomes severe. Also, ask your healthcare provider or pharmacist about other ways to control pain. This might be with heat, ice, or relaxation. And follow any other instructions your surgeon or nurse gives you.      Stay on schedule with your medicine.     Tips for taking pain medicine  To get the best relief possible, remember these points:   Pain medicines can upset your stomach. Taking them with a little food may  help.  Most pain relievers taken by mouth need at least 20 to 30 minutes to start to work.  Don't wait till your pain becomes severe before you take your medicine. Try to time your medicine so that you can take it before starting an activity. This might be before you get dressed, go for a walk, or sit down for dinner.  Constipation is a common side effect of some pain medicines. Call your healthcare provider before taking any medicines, such as laxatives or stool softeners, to help ease constipation. Also ask if you should skip any foods. Drinking lots of fluids and eating foods, such as fruits and vegetables, that are high in fiber can also help. Remember, don't take laxatives unless your surgeon has prescribed them.  Drinking alcohol and taking pain medicine can cause dizziness and slow your breathing. It can even be deadly. Don't drink alcohol while taking pain medicine.  Pain medicine can make you react more slowly to things. Don't drive or run machinery while taking pain medicine.  Your healthcare provider may tell you to take acetaminophen to help ease your pain. Ask them how much you're supposed to take each day. Acetaminophen or other pain relievers may interact with your prescription medicines or other over-the-counter (OTC) medicines. Some prescription medicines have acetaminophen and other ingredients in them. Using both prescription and OTC acetaminophen for pain can cause you to accidentally overdose. Read the labels on your OTC medicines with care. This will help you to clearly know the list of ingredients, how much to take, and any warnings. It may also help you not take too much acetaminophen. If you have questions or don't understand the information, ask your pharmacist or healthcare provider to explain it to you before you take the OTC medicine.   Managing nausea  Some people have an upset stomach (nausea) after surgery. This is often because of anesthesia, pain, or pain medicine, less movement of  food in the stomach, or the stress of surgery. These tips will help you handle nausea and eat healthy foods as you get better. If you were on a special food plan before surgery, ask your healthcare provider if you should follow it while you get better. Check with your provider on how your eating should progress. It may depend on the surgery you had. These general tips may help:   Don't push yourself to eat. Your body will tell you when to eat and how much.  Start off with clear liquids and soup. They're easier to digest.  Next try semi-solid foods as you feel ready. These include mashed potatoes, applesauce, and gelatin.  Slowly move to solid foods. Don’t eat fatty, rich, or spicy foods at first.  Don't force yourself to have 3 large meals a day. Instead eat smaller amounts more often.  Take pain medicines with a small amount of solid food, such as crackers or toast. This helps prevent nausea.  When to call your healthcare provider  Call your healthcare provider right away if you have any of these:   You still have too much pain, or the pain gets worse, after taking the medicine. The medicine may not be strong enough. Or there may be a complication from the surgery.  You feel too sleepy, dizzy, or groggy. The medicine may be too strong.  Side effects, such as nausea or vomiting. Your healthcare provider may advise taking other medicines to treat these or may change your treatment plan..  Skin changes, such as rash, itching, or hives. This may mean you have an allergic reaction. Your provider may advise taking other medicines.  The incision looks different (for instance, part of it opens up).  Bleeding or fluid leaking from the incision site, and you weren't told to expect that.  Fever of 100.4°F (38°C) or higher, or as directed by your healthcare provider.  Call 911  Call 911 right away if you have:   Trouble breathing  Facial swelling    If you have obstructive sleep apnea   You were given anesthesia medicine  during surgery to keep you comfortable and free of pain. After surgery, you may have more apnea spells because of this medicine and other medicines you were given. The spells may last longer than normal.    At home:  Keep using the continuous positive airway pressure (CPAP) device when you sleep. Unless your healthcare provider tells you not to, use it when you sleep, day or night. CPAP is a common device used to treat obstructive sleep apnea.  Talk with your provider before taking any pain medicine, muscle relaxants, or sedatives. Your provider will tell you about the possible dangers of taking these medicines.  Contact your provider if your sleeping changes a lot even when taking medicines as directed.  [employer] last reviewed this educational content on 4/1/2024  This information is for informational purposes only. This is not intended to be a substitute for professional medical advice, diagnosis, or treatment. Always seek the advice and follow the directions from your physician or other qualified health care provider.  © 2990-0618 [employer]. All rights reserved. This information is not intended as a substitute for professional medical care. Always follow your healthcare professional's instructions.

## 2025-04-30 ENCOUNTER — APPOINTMENT (OUTPATIENT)
Dept: GENERAL RADIOLOGY | Facility: HOSPITAL | Age: 60
End: 2025-04-30
Attending: UROLOGY
Payer: COMMERCIAL

## 2025-04-30 ENCOUNTER — TELEPHONE (OUTPATIENT)
Dept: SURGERY | Facility: CLINIC | Age: 60
End: 2025-04-30

## 2025-04-30 ENCOUNTER — ANESTHESIA (OUTPATIENT)
Dept: SURGERY | Facility: HOSPITAL | Age: 60
End: 2025-04-30
Payer: COMMERCIAL

## 2025-04-30 ENCOUNTER — ANESTHESIA EVENT (OUTPATIENT)
Dept: SURGERY | Facility: HOSPITAL | Age: 60
End: 2025-04-30
Payer: COMMERCIAL

## 2025-04-30 ENCOUNTER — HOSPITAL ENCOUNTER (OUTPATIENT)
Facility: HOSPITAL | Age: 60
Setting detail: HOSPITAL OUTPATIENT SURGERY
Discharge: HOME | End: 2025-04-30
Attending: UROLOGY | Admitting: UROLOGY
Payer: COMMERCIAL

## 2025-04-30 VITALS
HEIGHT: 68 IN | TEMPERATURE: 98 F | WEIGHT: 189 LBS | DIASTOLIC BLOOD PRESSURE: 72 MMHG | RESPIRATION RATE: 16 BRPM | SYSTOLIC BLOOD PRESSURE: 106 MMHG | HEART RATE: 61 BPM | BODY MASS INDEX: 28.64 KG/M2 | OXYGEN SATURATION: 99 %

## 2025-04-30 PROBLEM — N20.0 BILATERAL KIDNEY STONES: Status: ACTIVE | Noted: 2025-04-30

## 2025-04-30 PROCEDURE — 52356 CYSTO/URETERO W/LITHOTRIPSY: CPT | Performed by: UROLOGY

## 2025-04-30 DEVICE — URETERAL STENT
Type: IMPLANTABLE DEVICE | Site: URETER | Status: FUNCTIONAL
Brand: CONTOUR™

## 2025-04-30 RX ORDER — HYDROMORPHONE HYDROCHLORIDE 1 MG/ML
0.4 INJECTION, SOLUTION INTRAMUSCULAR; INTRAVENOUS; SUBCUTANEOUS EVERY 5 MIN PRN
Status: DISCONTINUED | OUTPATIENT
Start: 2025-04-30 | End: 2025-04-30

## 2025-04-30 RX ORDER — NALOXONE HYDROCHLORIDE 0.4 MG/ML
80 INJECTION, SOLUTION INTRAMUSCULAR; INTRAVENOUS; SUBCUTANEOUS AS NEEDED
Status: DISCONTINUED | OUTPATIENT
Start: 2025-04-30 | End: 2025-04-30

## 2025-04-30 RX ORDER — SODIUM CHLORIDE, SODIUM LACTATE, POTASSIUM CHLORIDE, CALCIUM CHLORIDE 600; 310; 30; 20 MG/100ML; MG/100ML; MG/100ML; MG/100ML
INJECTION, SOLUTION INTRAVENOUS CONTINUOUS
Status: DISCONTINUED | OUTPATIENT
Start: 2025-04-30 | End: 2025-04-30

## 2025-04-30 RX ORDER — MORPHINE SULFATE 4 MG/ML
2 INJECTION, SOLUTION INTRAMUSCULAR; INTRAVENOUS EVERY 10 MIN PRN
Status: DISCONTINUED | OUTPATIENT
Start: 2025-04-30 | End: 2025-04-30

## 2025-04-30 RX ORDER — HYDROMORPHONE HYDROCHLORIDE 1 MG/ML
0.6 INJECTION, SOLUTION INTRAMUSCULAR; INTRAVENOUS; SUBCUTANEOUS EVERY 5 MIN PRN
Status: DISCONTINUED | OUTPATIENT
Start: 2025-04-30 | End: 2025-04-30

## 2025-04-30 RX ORDER — DEXAMETHASONE SODIUM PHOSPHATE 4 MG/ML
VIAL (ML) INJECTION AS NEEDED
Status: DISCONTINUED | OUTPATIENT
Start: 2025-04-30 | End: 2025-04-30 | Stop reason: SURG

## 2025-04-30 RX ORDER — PHENAZOPYRIDINE HYDROCHLORIDE 200 MG/1
200 TABLET, FILM COATED ORAL ONCE
Status: COMPLETED | OUTPATIENT
Start: 2025-04-30 | End: 2025-04-30

## 2025-04-30 RX ORDER — MORPHINE SULFATE 10 MG/ML
6 INJECTION, SOLUTION INTRAMUSCULAR; INTRAVENOUS EVERY 10 MIN PRN
Status: DISCONTINUED | OUTPATIENT
Start: 2025-04-30 | End: 2025-04-30

## 2025-04-30 RX ORDER — PROCHLORPERAZINE EDISYLATE 5 MG/ML
5 INJECTION INTRAMUSCULAR; INTRAVENOUS EVERY 8 HOURS PRN
Status: DISCONTINUED | OUTPATIENT
Start: 2025-04-30 | End: 2025-04-30

## 2025-04-30 RX ORDER — LIDOCAINE HYDROCHLORIDE 20 MG/ML
JELLY TOPICAL AS NEEDED
Status: DISCONTINUED | OUTPATIENT
Start: 2025-04-30 | End: 2025-04-30 | Stop reason: HOSPADM

## 2025-04-30 RX ORDER — ROCURONIUM BROMIDE 10 MG/ML
INJECTION, SOLUTION INTRAVENOUS AS NEEDED
Status: DISCONTINUED | OUTPATIENT
Start: 2025-04-30 | End: 2025-04-30 | Stop reason: SURG

## 2025-04-30 RX ORDER — HYDROMORPHONE HYDROCHLORIDE 1 MG/ML
0.2 INJECTION, SOLUTION INTRAMUSCULAR; INTRAVENOUS; SUBCUTANEOUS EVERY 5 MIN PRN
Status: DISCONTINUED | OUTPATIENT
Start: 2025-04-30 | End: 2025-04-30

## 2025-04-30 RX ORDER — MORPHINE SULFATE 4 MG/ML
4 INJECTION, SOLUTION INTRAMUSCULAR; INTRAVENOUS EVERY 10 MIN PRN
Status: DISCONTINUED | OUTPATIENT
Start: 2025-04-30 | End: 2025-04-30

## 2025-04-30 RX ORDER — CEFADROXIL 500 MG/1
500 CAPSULE ORAL 2 TIMES DAILY
Qty: 6 CAPSULE | Refills: 0 | Status: SHIPPED | OUTPATIENT
Start: 2025-04-30 | End: 2025-05-03

## 2025-04-30 RX ORDER — ONDANSETRON 2 MG/ML
INJECTION INTRAMUSCULAR; INTRAVENOUS AS NEEDED
Status: DISCONTINUED | OUTPATIENT
Start: 2025-04-30 | End: 2025-04-30 | Stop reason: SURG

## 2025-04-30 RX ORDER — ACETAMINOPHEN 500 MG
1000 TABLET ORAL ONCE
Status: COMPLETED | OUTPATIENT
Start: 2025-04-30 | End: 2025-04-30

## 2025-04-30 RX ORDER — ONDANSETRON 2 MG/ML
4 INJECTION INTRAMUSCULAR; INTRAVENOUS EVERY 6 HOURS PRN
Status: DISCONTINUED | OUTPATIENT
Start: 2025-04-30 | End: 2025-04-30

## 2025-04-30 RX ADMIN — ROCURONIUM BROMIDE 40 MG: 10 INJECTION, SOLUTION INTRAVENOUS at 07:36:00

## 2025-04-30 RX ADMIN — DEXAMETHASONE SODIUM PHOSPHATE 4 MG: 4 MG/ML VIAL (ML) INJECTION at 08:18:00

## 2025-04-30 RX ADMIN — ONDANSETRON 4 MG: 2 INJECTION INTRAMUSCULAR; INTRAVENOUS at 08:18:00

## 2025-04-30 NOTE — TELEPHONE ENCOUNTER
This patient needs a cystoscopy and bilateral stent removal in 1 week.  No x-rays are needed.  Please call the patient and arrange.

## 2025-04-30 NOTE — ANESTHESIA POSTPROCEDURE EVALUATION
Patient: Ricardo Flores    Procedure Summary       Date: 04/30/25 Room / Location: [facility] MAIN OR [address] [place] [address] OR; [facility] X-ray    Anesthesia Start: 0732 Anesthesia Stop:     Procedures:       Cystoscopy, bilateral ureteroscopy, holmium laser lithotripsy, bilateral stent placement (Bilateral: Ureter)      Laser holmium lithotripsy (Bilateral: Ureter)      Cystoscopy stent insertion (Bilateral: Ureter)      XR OR - N/C Diagnosis:       Ureteral stone      (Ureteral stone [N20.1])      (Cystoscopy, bilateral ureteroscopy, holmium laser lithotripsy, bilateral stent placement)    Scheduled Providers: Ken Cole MD Anesthesiologist: Margarita Duke MD    Anesthesia Type: general ASA Status: 2            Anesthesia Type: general    Vitals Value Taken Time   /87 04/30/25 08:39   Temp 97.7 °F (36.5 °C) 04/30/25 08:39   Pulse 62 04/30/25 08:43   Resp 15 04/30/25 08:43   SpO2 97 % 04/30/25 08:43   Vitals shown include unfiled device data.    [facility] AN Post Evaluation:   Patient Evaluated in PACU  Patient Participation: complete - patient participated  Level of Consciousness: awake  Pain Management: adequate  Airway Patency:patent  Dental exam unchanged from preop  Yes    Cardiovascular Status: acceptable  Respiratory Status: acceptable  Postoperative Hydration acceptable      MARGARITA DUKE MD  4/30/2025 8:44 AM

## 2025-04-30 NOTE — OPERATIVE REPORT
[facility]  part of [facility]    Operative Note     Ricardo Flores Location: OR   [facility] [id number] MRN [id number]   Admission Date 4/30/2025 Operation Date 4/30/2025   Attending Physician Ken Cole MD Operating Physician Ken Cole MD      Preoperative Diagnosis: Bilateral kidney stones     Postoperative Diagnosis: Bilateral kidney stones  Procedure Performed:   Cystoscopy, bilateral ureteroscopy, holmium laser lithotripsy, bilateral stent placement     Primary Surgeon: Ken Cole MD      Assistant: None     Surgical Findings: Right sided 6 mm stone found in a lower pole calyx, 3 to 4 mm stone on the left in the midpole calyx both fragmented and dusted into small fragments.     Anesthesia: General     Complications: None     Implants:   Implant Name Type Inv. Item Serial No.  Lot No. LRB No. Used Action   STENT URET 6FR L26CM PERCFLX HYDR+ PGTL TAPR - SN/A  STENT URET 6FR L26CM PERCFLX HYDR+ PGTL TAPR N/A [employer] [id number] Right 1 Implanted   STENT URET 6FR L26CM PERCFLX HYDR+ PGTL TAPR - SN/A  STENT URET 6FR L26CM PERCFLX HYDR+ PGTL TAPR N/A [employer] [id number] Right 1 Implanted        Specimen: None     Drains: None     Condition: Stable     Estimated Blood Loss: No data recorded     Summary of Case: After consent was obtained and preoperative antibiotics administered the patient was brought into the operating appeared anesthesia were administered and the patient was placed in the dorsolithotomy position.  The groin was prepped and draped in the usual sterile standard fashion.  A 22 Tamazight rigid cystoscope was placed per urethra.  The previously placed right sided ureteral stent was grasped distally and pulled back to the urethral meatus.  It was cannulated using a sensor wire.  An 8/10 coaxial dilator was used to introduce a second wire on the right side.  Over one of the wires I placed a [place] Librato view flexible  disposable digital ureteroscope.  It was advanced without resistance into the collecting system on the right side.  In a mid to lower pole calyx there was a 6 mm stone which was radiopaque on intraoperative fluoroscopy.  A 200 µm homeon laser fiber was used to fragment and dust the stone at the very fine stone fragments.  No additional stones could be seen on careful inspection of the collecting system on the right side.  The ureteroscope was removed leaving the safety wire in place.  The wire was backloaded onto the rigid cystoscope and I placed a 6 Portuguese by 26 cm stent under fluoroscopic and cystoscopic guidance.    This a maneuver was then duplicated on the left side.  A 3 to 4 mm stone was found in a mid to lower pole calyx.  It was similarly fragmented and dusted.  6 Portuguese by 26 cm stent was placed on the left side.  The bladder was emptied and the cystoscope was removed.  2% viscous lidocaine was instilled per urethra.  The patient was placed in the supine position awakened extubated and taken out to the postanesthesia care unit in stable condition.  I was present and performed the entirety of his procedure.  There were no perioperative or immediate postop complications.     Ken Cole MD  4/30/2025  8:30 AM

## 2025-04-30 NOTE — ANESTHESIA PROCEDURE NOTES
Airway  Date/Time: 4/30/2025 7:47 AM  Reason: Elective    Airway not difficult    General Information and Staff   Patient location during procedure: OR  Anesthesiologist: Keshav Dumont MD  Performed: anesthesiologist   Performed by: Keshav Dumont MD  Authorized by: Keshav Dumont MD        Indications and Patient Condition  Indications for airway management: anesthesia  Sedation level: deep      Preoxygenated: yesPatient position: sniffing    Mask difficulty assessment: 1 - vent by mask    Final Airway Details    Final airway type: endotracheal airway    Successful airway: ETT  Cuffed: yes   Successful intubation technique: direct laryngoscopy  Endotracheal tube insertion site: oral    Placement verified by: capnometry   Measured from: teeth  Number of attempts at approach: 1

## 2025-04-30 NOTE — H&P
History of Present Illness:   Patient is a 59 year old male who was admitted to the hospital for Ureteral stone with hydronephrosis:  59-year-old male with a chronic history of kidney stones most recently 12 years ago admitted to the emergency department yesterday with moderate to severe right sided flank pain.  No fevers chills nausea or vomiting.  In the emergency department, noncontrast CT demonstrates a 6 mm proximal right ureteral stone with mild hydronephrosis.  He has required 2 doses of morphine since then at 1 AM and again this morning at 11 AM.  Denies passing a stone.  Reports the pain at 6-7 out of 10 this morning.     Past Medical History  [Past Medical History]    [Past Medical History]   Arrhythmia    Calculus of kidney    Esophageal reflux     diet controlled    Sleep apnea        Past Surgical History  [Past Surgical History]    [Past Surgical History]        Procedure Laterality Date    Appendectomy        Arthroscopy of joint unlisted Right 2015     knee x2    Arthroscopy of joint unlisted Left       knee    Arthroscopy of joint unlisted Right       shoulder    Cystoscopy,insert ureteral stent   2012    Fracture surgery Left       clavicle    Hernia surgery            Family History  [Family History]    [Family History]        Problem Relation Age of Onset    Hypertension Father          Social History      Pediatric History   Patient Parents    Not on file           Other Topics Concern    Not on file   Social History Narrative    Not on file            Current Medications:  [Current Hospital Medications]    [Current Hospital Medications]         Current Facility-Administered Medications   Medication Dose Route Frequency    potassium chloride 20 mEq in sodium chloride 0.9% 1000mL infusion premix   Intravenous Continuous    [START ON 4/20/2025] heparin (Porcine) 5000 UNIT/ML injection 5,000 Units  5,000 Units Subcutaneous Q8H KIARA    acetaminophen (Tylenol Extra Strength) tab 500 mg  500 mg Oral  Q4H PRN    acetaminophen (Tylenol) tab 650 mg  650 mg Oral Q4H PRN     Or    HYDROcodone-acetaminophen (Norco) 5-325 MG per tab 1 tablet  1 tablet Oral Q4H PRN     Or    HYDROcodone-acetaminophen (Norco) 5-325 MG per tab 2 tablet  2 tablet Oral Q4H PRN    melatonin tab 3 mg  3 mg Oral Nightly PRN    polyethylene glycol (PEG 3350) (Miralax) 17 g oral packet 17 g  17 g Oral Daily PRN    sennosides (Senokot) tab 17.2 mg  17.2 mg Oral Nightly PRN    bisacodyl (Dulcolax) 10 MG rectal suppository 10 mg  10 mg Rectal Daily PRN    fleet enema (Fleet) rectal enema 133 mL  1 enema Rectal Once PRN    ondansetron (Zofran) 4 MG/2ML injection 4 mg  4 mg Intravenous Q6H PRN    prochlorperazine (Compazine) 10 MG/2ML injection 5 mg  5 mg Intravenous Q8H PRN    guaiFENesin (Robitussin) 100 MG/5 ML oral liquid 200 mg  200 mg Oral Q4H PRN    aspirin-acetaminophen-caffeine (Excedrin migraine) 250-250-65 MG per tab 1 tablet  1 tablet Oral Q4H PRN    potassium chloride 40 mEq in 250mL sodium chloride 0.9% IVPB premix  40 mEq Intravenous Once    SUMAtriptan (Imitrex) 6 MG/0.5ML SUBQ injection 6 mg  6 mg Subcutaneous Once    HYDROmorphone (Dilaudid) 1 MG/ML injection 0.2 mg  0.2 mg Intravenous Q2H PRN     Or    HYDROmorphone (Dilaudid) 1 MG/ML injection 0.4 mg  0.4 mg Intravenous Q2H PRN     Or    HYDROmorphone (Dilaudid) 1 MG/ML injection 0.8 mg  0.8 mg Intravenous Q2H PRN     [Prescriptions Prior to Admission]    [Prescriptions Prior to Admission]       Medications Prior to Admission   Medication Sig    Vitamin C 500 MG Oral Tab Take 1 tablet (500 mg total) by mouth in the morning.    Cholecalciferol (VITAMIN D3) 1000 units Oral Cap Take 1 tablet by mouth in the morning.    Menaquinone-7 (VITAMIN K2 OR) Take 1 tablet by mouth in the morning.    Multiple Vitamins-Minerals (MULTI-VITAMIN/MINERALS) Oral Tab Take 1 tablet by mouth in the morning.        Allergies  [Allergies]    [Allergies]        Allergen Reactions    Adhesive Tape          Skin irritation     Hydrocodone NAUSEA AND VOMITING       Headaches/Nausea and vomiting for 3 days     Morphine NAUSEA AND VOMITING       Headaches and vomiting for 3 days         Review of Systems:    Pertinent items are noted in HPI.     Physical Exam:   Blood pressure 102/62, pulse 61, temperature 98.3 °F (36.8 °C), temperature source Oral, resp. rate 16, height 5' 8\" (1.727 m), weight 191 lb 4.8 oz (86.8 kg), SpO2 94%.     General appearance: alert, appears stated age, and cooperative  Chest wall: no tenderness  Abdominal: soft, non-tender; bowel sounds normal; no masses,  no organomegaly  Male genitalia: normal  Pulses: 2+ and symmetric  Skin: Skin color, texture, turgor normal. No rashes or lesions     Results:      Laboratory Data:        Lab Results   Component Value Date     WBC 12.1 (H) 04/18/2025     HGB 14.6 04/18/2025     HCT 41.4 04/18/2025     .0 04/18/2025     CREATSERUM 1.31 (H) 04/18/2025     BUN 16 04/18/2025      04/18/2025     K 3.4 (L) 04/18/2025      04/18/2025     CO2 21.0 04/18/2025      (H) 04/18/2025     CA 9.3 04/18/2025     ALB 4.7 04/18/2025     ALKPHO 46 04/18/2025     TP 7.4 04/18/2025     AST 23 04/18/2025     ALT 19 04/18/2025          Imaging:  CT ABDOMEN+PELVIS(CONTRAST ONLY)(CPT=74177)  Result Date: 4/18/2025  CONCLUSION:  1. A 6 mm right ureteral calculus with mild right hydroureteronephrosis. 2. A incompletely characterized 1.6 cm exophytic right lower pole renal lesion.  Follow-up nonemergent ultrasound recommended for further characterization.  3. 4 mm left upper pole renal calculus.    Dictated by (CST): Roger House MD on 4/18/2025 at 9:26 PM     Finalized by (CST): Roger House MD on 4/18/2025 at 9:35 PM                 Impression:     Ureteral stone with hydronephrosis              Recommendations:  Reviewed findings with patient and wife at the bedside.  Discussed the likelihood of passing a 6 mm stone.  We agreed tentatively to proceed with  cystoscopy, right ureteroscopy holmium laser lithotripsy and stent placement to be done tomorrow.  Will feed the patient today and make him n.p.o. after midnight.  Will start him on empiric antibiotics pending final urine culture results.  All questions were answered.  Will proceed accordingly.  Addendum: s/p cysto and placement of bilateral ureteral stents 4/20/25.  Here for defintive ureteroscopy.  Risks and side effects reviewed and he understands and agrees.

## 2025-04-30 NOTE — ANESTHESIA PREPROCEDURE EVALUATION
Anesthesia PreOp Note    HPI:     Ricardo Flores is a 59 year old male who presents for preoperative consultation requested by: Ken Cole MD    Date of Surgery: 4/30/2025    Procedure(s):  Cystoscopy, bilateral ureteroscopy, holmium laser lithotripsy, bilateral stent placement  Laser holmium lithotripsy  Cystoscopy stent insertion  Indication: Ureteral stone [N20.1]    Relevant Problems   No relevant active problems       NPO:  Last Liquid Consumption Date: 04/29/25  Last Liquid Consumption Time: 2300  Last Solid Consumption Date: 04/29/25  Last Solid Consumption Time: 2200  Last Liquid Consumption Date: 04/29/25          History Review:  Patient Active Problem List    Diagnosis Date Noted    Ureteral stone with hydronephrosis 04/18/2025       Past Medical History[1]    Past Surgical History[2]    Prescriptions Prior to Admission[3]  Current Medications and Prescriptions Ordered in Epic[4]    Allergies[5]    Family History[6]  Social Hx on file[7]    Available pre-op labs reviewed.  Lab Results   Component Value Date    WBC 6.5 04/23/2025    RBC 4.62 04/23/2025    HGB 14.6 04/23/2025    HCT 41.5 04/23/2025    MCV 89.8 04/23/2025    MCH 31.6 04/23/2025    MCHC 35.2 04/23/2025    RDW 12.1 04/23/2025    .0 04/23/2025     Lab Results   Component Value Date     04/23/2025    K 4.1 04/23/2025     04/23/2025    CO2 24.0 04/23/2025    BUN 15 04/23/2025    CREATSERUM 1.05 04/23/2025     (H) 04/23/2025    CA 9.2 04/23/2025          Vital Signs:  Body mass index is 28.74 kg/m².   height is 1.727 m (5' 8\") and weight is 85.7 kg (189 lb). His oral temperature is 97.8 °F (36.6 °C). His blood pressure is 106/79 and his pulse is 60. His respiration is 18 and oxygen saturation is 95%.   Vitals:    04/28/25 1457 04/30/25 0659   BP:  106/79   Pulse:  60   Resp:  18   Temp:  97.8 °F (36.6 °C)   TempSrc:  Oral   SpO2:  95%   Weight: 84.4 kg (186 lb) 85.7 kg (189 lb)   Height: 1.727 m (5' 8\") 1.727 m  (5' 8\")        Anesthesia Evaluation     Patient summary reviewed and Nursing notes reviewed    Airway   Mallampati: II  Dental      Pulmonary - negative ROS and normal exam   Cardiovascular - normal exam  Exercise tolerance: good    NYHA Classification: I    Neuro/Psych - negative ROS     GI/Hepatic/Renal - negative ROS     Endo/Other - negative ROS   Abdominal                  Anesthesia Plan:   ASA:  2  Plan:   General  Airway:  ETT  Post-op Pain Management: IV analgesics      I have informed Ricardo Flores and/or legal guardian or family member of the nature of the anesthetic plan, benefits, risks including possible dental damage if relevant, major complications, and any alternative forms of anesthetic management.   All of the patient's questions were answered to the best of my ability. The patient desires the anesthetic management as planned.  MARGARITA DUKE MD  4/30/2025 7:15 AM  Present on Admission:  **None**           [1]   Past Medical History:   Arrhythmia    Calculus of kidney    Esophageal reflux    diet controlled    Migraines    PONV (postoperative nausea and vomiting)    Sleep apnea    no CPAP    Visual impairment    reading glasses   [2]   Past Surgical History:  Procedure Laterality Date    Appendectomy      Arthroscopy of joint unlisted Right 2015    knee x8    Arthroscopy of joint unlisted Left     knee    Arthroscopy of joint unlisted Right     shoulder    Cystoscopy,insert ureteral stent  2012    Fracture surgery Left     clavicle    Hernia surgery      Other surgical history      nasal surgery after fracture   [3]   Medications Prior to Admission   Medication Sig Dispense Refill Last Dose/Taking    UBRELVY 100 MG Oral Tab Take 100 mg by mouth as needed (migraine).   4/23/2025    Amylase-Lipase-Protease (DIGESTIVE ENZYMES OR) Take 1 capsule by mouth daily.   4/28/2025    acetaminophen 325 MG Oral Tab Take 2 tablets (650 mg total) by mouth every 4 (four) hours as needed.   4/29/2025 at  12:00 PM    Multiple Vitamins-Minerals (MULTI-VITAMIN/MINERALS) Oral Tab Take 1 tablet by mouth in the morning.   4/23/2025    Vitamin C 500 MG Oral Tab Take 1 tablet (500 mg total) by mouth in the morning.   4/29/2025 at 12:00 PM    Cholecalciferol (VITAMIN D3) 1000 units Oral Cap Take 1 tablet by mouth in the morning.   4/23/2025    Menaquinone-7 (VITAMIN K2 OR) Take 1 tablet by mouth in the morning.   4/23/2025    Lactobacillus (PROBIOTIC ACIDOPHILUS) Oral Cap Take 1 capsule by mouth daily.   4/28/2025    Tretinoin 0.05 % External Cream Apply 1 Application topically as needed (skin breakout).   Unknown   [4]   Current Facility-Administered Medications Ordered in Epic   Medication Dose Route Frequency Provider Last Rate Last Admin    lactated ringers infusion   Intravenous Continuous Ken Cole MD        ceFAZolin (Ancef) 2g in 10mL IV syringe premix  2 g Intravenous Once Ken Cole MD         No current [facility]-ordered outpatient medications on file.   [5]   Allergies  Allergen Reactions    Codeine NAUSEA AND VOMITING     Other reaction(s): Unknown    Adhesive Tape      Skin irritation     Dust Mite Extract OTHER (SEE COMMENTS)     Nasal congestion and sneezing    Hydrocodone NAUSEA AND VOMITING     Headaches/Nausea and vomiting for 3 days     Morphine NAUSEA AND VOMITING     Headaches and vomiting for 3 days     Latex RASH     Other reaction(s): Unknown   [6]   Family History  Problem Relation Age of Onset    Hypertension Father     Other (Other) Mother         kidney stones   [7]   Social History  Socioeconomic History    Marital status:    Tobacco Use    Smoking status: Never    Smokeless tobacco: Never   Vaping Use    Vaping status: Never Used   Substance and Sexual Activity    Alcohol use: No    Drug use: No

## 2025-04-30 NOTE — INTERVAL H&P NOTE
Pre-op Diagnosis: Ureteral stone [N20.1]    The above referenced H&P was reviewed by Ken Cole MD on 4/30/2025, the patient was examined and no significant changes have occurred in the patient's condition since the H&P was performed.  I discussed with the patient and/or legal representative the potential benefits, risks and side effects of this procedure; the likelihood of the patient achieving goals; and potential problems that might occur during recuperation.  I discussed reasonable alternatives to the procedure, including risks, benefits and side effects related to the alternatives and risks related to not receiving this procedure.  We will proceed with procedure as planned.

## 2025-05-01 NOTE — TELEPHONE ENCOUNTER
Surgery scheduler, Zulema already scheduled pt for cysto/stent removal next week on 5/8/25 with MEDARDO.

## 2025-05-08 ENCOUNTER — PROCEDURE (OUTPATIENT)
Dept: SURGERY | Facility: CLINIC | Age: 60
End: 2025-05-08

## 2025-05-08 ENCOUNTER — TELEPHONE (OUTPATIENT)
Dept: SURGERY | Facility: CLINIC | Age: 60
End: 2025-05-08

## 2025-05-08 DIAGNOSIS — N20.0 KIDNEY STONE: Primary | ICD-10-CM

## 2025-05-08 PROCEDURE — 52310 CYSTOSCOPY AND TREATMENT: CPT | Performed by: UROLOGY

## 2025-05-08 RX ORDER — CIPROFLOXACIN 500 MG/1
500 TABLET, FILM COATED ORAL ONCE
Status: SHIPPED | OUTPATIENT
Start: 2025-05-08

## 2025-05-08 NOTE — TELEPHONE ENCOUNTER
Patient calling would like to if he can eat or drink before today's procedure for stent removal?Please advise

## 2025-05-08 NOTE — TELEPHONE ENCOUNTER
Spoke with patient, I advised him that he can eat and drink before his procedure. I advised him that he can take medications like normal if he takes anything. I also advised him that he can bring himself and drive himself home.     Patient is aware to arrive at 2:30 pm.     I informed lead, procedure was assigned on daily schedule.     Future Appointments   Date Time Provider Department Center   5/8/2025  3:00 PM Ken Cole MD [facility]

## 2025-05-09 NOTE — PROGRESS NOTES
Ricardo Flores is a 59 year old male.    HPI:     Chief Complaint   Patient presents with    Procedure     Cystoscopy with bilateral stent removal. PT states he forgot to bring stones with removal       59-year-old male presents for cystoscopy and bilateral ureteral stent removal.  He status post cystoscopy, bilateral ureteroscopy holmium laser lithotripsy and stent placement 2025.  Reports mild to moderate stent related issues.  Otherwise feels well.      HISTORY:  Past Medical History[1]   Past Surgical History[2]   Family History[3]   Social History: Short Social Hx on File[4]     Medications (Active prior to today's visit):  Current Medications[5]    Allergies:  Allergies[6]      ROS:       PHYSICAL EXAM:   Ricardo Flores  : 6/3/1965  Referring Physician: No ref. provider found     Chief Complaint   Patient presents with    Procedure     Cystoscopy with bilateral stent removal. PT states he forgot to bring stones with removal           CYSTOSCOPY    Anesthesia:  2% lidocaine gel    Urethra: Normal  Prostate / Pelvic: Normal  Bladder: Normal.  No tumor, stone, diverticulum, or glomerulation  U.O's: Normal  Trabeculation: none.  Bilateral ureteral stents removed      POST CYSTOSCOPY MEDICATIONS: sample one tablet Cipro 500mg given to patient    DIAGNOSIS:     PLAN: see below       ASSESSMENT/PLAN:   Assessment   Encounter Diagnosis   Name Primary?    Kidney stone Yes       Recommend:  - 24-hour urine collection.  - Renal ultrasound prior to follow-up in 4 to 6 weeks.         Orders This Visit:  Orders Placed This Encounter   Procedures    Urine PH 24 HR    Calcium, 24Hr Urine    Oxalate, Quant, 24-Hour Urine    Urine Citrate Excretion 24HR    Sodium, Urine, 24-Hour    Uric Acid, Urine 24 hr    Urine Phosphorus 24 HR    Specimen to Pathology, Tissue       Meds This Visit:  Requested Prescriptions      No prescriptions requested or ordered in this encounter       Imaging & Referrals:  US  KIDNEYS (GVQ=[id number])     5/9/2025  Ken Cole MD               [1]   Past Medical History:   Arrhythmia    Calculus of kidney    Esophageal reflux    diet controlled    Migraines    PONV (postoperative nausea and vomiting)    Sleep apnea    no CPAP    Visual impairment    reading glasses   [2]   Past Surgical History:  Procedure Laterality Date    Appendectomy      Arthroscopy of joint unlisted Right 2015    knee x8    Arthroscopy of joint unlisted Left     knee    Arthroscopy of joint unlisted Right     shoulder    Cystoscopy,insert ureteral stent  2012    Fracture surgery Left     clavicle    Hernia surgery      Other surgical history      nasal surgery after fracture   [3]   Family History  Problem Relation Age of Onset    Hypertension Father     Other (Other) Mother         kidney stones   [4]   Social History  Socioeconomic History    Marital status:    Tobacco Use    Smoking status: Never    Smokeless tobacco: Never   Vaping Use    Vaping status: Never Used   Substance and Sexual Activity    Alcohol use: No    Drug use: No     Social Drivers of Health     Food Insecurity: No Food Insecurity (4/19/2025)    NCSS - Food Insecurity     Worried About Running Out of Food in the Last Year: No     Ran Out of Food in the Last Year: No   Transportation Needs: No Transportation Needs (4/19/2025)    NCSS - Transportation     Lack of Transportation: No   Housing Stability: Not At Risk (4/19/2025)    NCSS - Housing/Utilities     Has Housing: Yes     Worried About Losing Housing: No     Unable to Get Utilities: No   [5]   Current Outpatient Medications   Medication Sig Dispense Refill    Lactobacillus (PROBIOTIC ACIDOPHILUS) Oral Cap Take 1 capsule by mouth daily.      Tretinoin 0.05 % External Cream Apply 1 Application topically as needed (skin breakout).      UBRELVY 100 MG Oral Tab Take 100 mg by mouth as needed (migraine).      Amylase-Lipase-Protease (DIGESTIVE ENZYMES OR) Take 1 capsule by mouth daily.       acetaminophen 325 MG Oral Tab Take 2 tablets (650 mg total) by mouth every 4 (four) hours as needed.      Multiple Vitamins-Minerals (MULTI-VITAMIN/MINERALS) Oral Tab Take 1 tablet by mouth in the morning.      Vitamin C 500 MG Oral Tab Take 1 tablet (500 mg total) by mouth in the morning.      Cholecalciferol (VITAMIN D3) 1000 units Oral Cap Take 1 tablet by mouth in the morning.      Menaquinone-7 (VITAMIN K2 OR) Take 1 tablet by mouth in the morning.     [6]   Allergies  Allergen Reactions    Codeine NAUSEA AND VOMITING     Other reaction(s): Unknown    Adhesive Tape      Skin irritation     Dust Mite Extract OTHER (SEE COMMENTS)     Nasal congestion and sneezing    Hydrocodone NAUSEA AND VOMITING     Headaches/Nausea and vomiting for 3 days     Morphine NAUSEA AND VOMITING     Headaches and vomiting for 3 days     Latex RASH     Other reaction(s): Unknown

## 2025-06-19 ENCOUNTER — LAB ENCOUNTER (OUTPATIENT)
Dept: LAB | Facility: HOSPITAL | Age: 60
End: 2025-06-19
Attending: UROLOGY
Payer: COMMERCIAL

## 2025-06-19 DIAGNOSIS — N20.0 KIDNEY STONE: ICD-10-CM

## 2025-06-19 LAB
CALCIUM 24H UR-SRATE: 196 MG/24HR (ref 100–300)
PH 24H UR: 5.5 [PH] (ref 5–8)
PHOSPHATE UR-SCNC: 1447.6 MG/24HR (ref 400–1300)
SODIUM SERPL-SCNC: 160 MMOL/L/24HR (ref 40–220)
SPECIMEN VOL UR: 2800 ML
URATE UR-MCNC: >360 MG/24HR (ref 250–750)

## 2025-06-19 PROCEDURE — 81003 URINALYSIS AUTO W/O SCOPE: CPT

## 2025-06-19 PROCEDURE — 84300 ASSAY OF URINE SODIUM: CPT

## 2025-06-19 PROCEDURE — 83945 ASSAY OF OXALATE: CPT

## 2025-06-19 PROCEDURE — 84560 ASSAY OF URINE/URIC ACID: CPT

## 2025-06-19 PROCEDURE — 82340 ASSAY OF CALCIUM IN URINE: CPT

## 2025-06-19 PROCEDURE — 84105 ASSAY OF URINE PHOSPHORUS: CPT

## 2025-06-19 PROCEDURE — 82507 ASSAY OF CITRATE: CPT

## 2025-06-24 LAB
CITRATE/CREAT RATIO: 250.37 MG/G CREAT
CITRIC ACID 24H UR: 468 MG/24 HR
CITRIC ACID UR: 167 MG/L
CREAT 24H UR: 1868 MG/24 HR
CREAT UR: 66.7 MG/DL

## 2025-07-14 ENCOUNTER — TELEPHONE (OUTPATIENT)
Dept: SURGERY | Facility: CLINIC | Age: 60
End: 2025-07-14

## 2025-07-30 ENCOUNTER — OFFICE VISIT (OUTPATIENT)
Dept: SURGERY | Facility: CLINIC | Age: 60
End: 2025-07-30
Payer: COMMERCIAL

## 2025-07-30 DIAGNOSIS — N20.0 KIDNEY STONE: Primary | ICD-10-CM

## 2025-07-30 DIAGNOSIS — N28.89 RENAL MASS: ICD-10-CM

## 2025-07-30 PROCEDURE — G2211 COMPLEX E/M VISIT ADD ON: HCPCS | Performed by: UROLOGY

## 2025-07-30 PROCEDURE — 99214 OFFICE O/P EST MOD 30 MIN: CPT | Performed by: UROLOGY

## 2025-07-30 RX ORDER — TAMSULOSIN HYDROCHLORIDE 0.4 MG/1
0.4 CAPSULE ORAL DAILY
Qty: 90 CAPSULE | Refills: 3 | Status: SHIPPED | OUTPATIENT
Start: 2025-07-30 | End: 2026-07-25

## 2025-08-04 ENCOUNTER — HOSPITAL ENCOUNTER (OUTPATIENT)
Dept: ULTRASOUND IMAGING | Facility: HOSPITAL | Age: 60
Discharge: HOME | End: 2025-08-04
Attending: UROLOGY

## 2025-08-04 DIAGNOSIS — N20.0 KIDNEY STONE: ICD-10-CM

## 2025-08-04 PROCEDURE — 76775 US EXAM ABDO BACK WALL LIM: CPT | Performed by: UROLOGY

## 2025-08-18 ENCOUNTER — TELEPHONE (OUTPATIENT)
Dept: SURGERY | Facility: CLINIC | Age: 60
End: 2025-08-18

## 2025-08-18 ENCOUNTER — OFFICE VISIT (OUTPATIENT)
Dept: SURGERY | Facility: CLINIC | Age: 60
End: 2025-08-18

## 2025-08-18 DIAGNOSIS — N20.0 KIDNEY STONE: Primary | ICD-10-CM

## 2025-08-18 DIAGNOSIS — R39.9 LOWER URINARY TRACT SYMPTOMS: ICD-10-CM

## 2025-08-18 DIAGNOSIS — N28.89 RENAL MASS: ICD-10-CM

## 2025-08-18 PROCEDURE — 99214 OFFICE O/P EST MOD 30 MIN: CPT | Performed by: UROLOGY

## 2025-08-18 PROCEDURE — G2211 COMPLEX E/M VISIT ADD ON: HCPCS | Performed by: UROLOGY

## (undated) DEVICE — CATHETER URET 5FR L70CM FLX OPN TIP NONPORTED

## (undated) DEVICE — PACK CUSTOM CYSTO

## (undated) DEVICE — SUTURE PDS II 4-0 PS-2

## (undated) DEVICE — VIOLET BRAIDED (POLYGLACTIN 910), SYNTHETIC ABSORBABLE SUTURE: Brand: COATED VICRYL

## (undated) DEVICE — ADHESIVE MASTISOL 2/3CC VL

## (undated) DEVICE — UROLOGY DRAIN BAG

## (undated) DEVICE — SUCTION CANISTER, 3000CC,SAFELINER: Brand: DEROYAL

## (undated) DEVICE — ABDOMINAL PAD: Brand: CURITY

## (undated) DEVICE — SUTURE FIBERLINK FBRWR 2 26IN

## (undated) DEVICE — PAD,EYE,LARGE,2 1/8"X2 5/8",STERILE,LF: Brand: MEDLINE

## (undated) DEVICE — ZIMMER® STERILE DISPOSABLE TOURNIQUET CUFF WITH PLC, DUAL PORT, SINGLE BLADDER, 42 IN. (107 CM)

## (undated) DEVICE — DRAPE SRG 90X60IN BCK TBL CVR

## (undated) DEVICE — BLADE SHVR 13CM 5.5MM FN 2 CUT

## (undated) DEVICE — BURR SHVR COOLCUT 13CM 4MM 8

## (undated) DEVICE — DECANTER SPIKE TRANSFLOW

## (undated) DEVICE — TUBING SET, GRAVITY, 4-SPIKE

## (undated) DEVICE — NEEDLE HPO 18GA 1.5IN ECLPS

## (undated) DEVICE — 3M™ COBAN™ NL STERILE NON-LATEX SELF-ADHERENT WRAP, 2084S, 4 IN X 5 YD (10 CM X 4,5 M), 18 ROLLS/CASE: Brand: 3M™ COBAN™

## (undated) DEVICE — AMBIENT SUPER TURBOVAC 90 IFS: Brand: COBLATION

## (undated) DEVICE — TOWEL,OR,DSP,ST,BLUE,DLX,2/PK,40PK/CS: Brand: MEDLINE

## (undated) DEVICE — GAUZE SPONGES,12 PLY: Brand: CURITY

## (undated) DEVICE — COTTON UNDERCAST PADDING,REGULAR FINISH: Brand: WEBRIL

## (undated) DEVICE — 3M™ STERI-STRIP™ REINFORCED ADHESIVE SKIN CLOSURES, R1547, 1/2 IN X 4 IN (12 MM X 100 MM), 6 STRIPS/ENVELOPE: Brand: 3M™ STERI-STRIP™

## (undated) DEVICE — ENDOSCOPIC VALVE WITH ADAPTER.: Brand: SURSEAL® II

## (undated) DEVICE — WEBRIL COTTON UNDERCAST PADDING: Brand: WEBRIL

## (undated) DEVICE — SOLUTION IRRIG 3000ML 0.9% NACL FLX CONT

## (undated) DEVICE — BATTERY

## (undated) DEVICE — CHLORAPREP 26ML APPLICATOR

## (undated) DEVICE — 6F 70CM BARD TIGERTAIL FLEXIBLE TIP URETERAL CATHETERS

## (undated) DEVICE — KIT ACL TRANS TIB HALL SAW BLD

## (undated) DEVICE — DRAPE SHEET LG

## (undated) DEVICE — INTENT TO BE USED WITH SUTURE MATERIAL FOR TISSUE CLOSURE: Brand: RICHARD-ALLAN® NEEDLE 3/8 CIRCLE TROCAR

## (undated) DEVICE — TOWEL OR BLU 16X26 STRL

## (undated) DEVICE — SOLUTION IRRIG 1000ML ST H2O AQUALITE PLAS

## (undated) DEVICE — SLEEVE COMPR MD KNEE LEN SGL USE KENDALL SCD

## (undated) DEVICE — STERILE LATEX POWDER-FREE SURGICAL GLOVESWITH NITRILE COATING: Brand: PROTEXIS

## (undated) DEVICE — SYSTEM TRNSF 39X49IN MOB AIR HALFMATS

## (undated) DEVICE — SUTURE FIBERWIRE AR-7209

## (undated) DEVICE — NITINOL WIRE WITH HYDROPHILIC TIP: Brand: SENSOR

## (undated) DEVICE — SUTURE FIBERWIRE S AR-7200

## (undated) DEVICE — GOWN SURG AERO BLUE PERF LG

## (undated) DEVICE — FIBER LSR 200UM 2J 80HZ 60W DL FOR LITHO

## (undated) DEVICE — Device

## (undated) DEVICE — 20 ML SYRINGE LUER-LOCK TIP: Brand: MONOJECT

## (undated) DEVICE — SOL  .9 3000ML

## (undated) DEVICE — PRECISION (9.0 X 0.51 X 31.0MM)

## (undated) DEVICE — ZIMMER® STERILE DISPOSABLE TOURNIQUET CUFF WITH PLC, DUAL PORT, SINGLE BLADDER, 34 IN. (86 CM)

## (undated) DEVICE — BLADE SHVR COOLCUT 3.8MM 2 CUT

## (undated) DEVICE — SHEET,DRAPE,53X77,STERILE: Brand: MEDLINE

## (undated) DEVICE — DILATOR/SHEATH SET: Brand: 8/10 DILATOR/SHEATH SET

## (undated) DEVICE — LOWER EXTREMITY: Brand: MEDLINE INDUSTRIES, INC.

## (undated) DEVICE — PEN: MARKING STD PT 100/CS: Brand: MEDICAL ACTION INDUSTRIES

## (undated) DEVICE — REM POLYHESIVE ADULT PATIENT RETURN ELECTRODE: Brand: VALLEYLAB

## (undated) DEVICE — GAMMEX® PI HYBRID SIZE 8, STERILE POWDER-FREE SURGICAL GLOVE, POLYISOPRENE AND NEOPRENE BLEND: Brand: GAMMEX

## (undated) DEVICE — DRAPE SRG 70X60IN SPLT U IMPRV

## (undated) DEVICE — SINGLE ACTION PUMPING SYSTEM

## (undated) DEVICE — PIN DRL 4MM RETROBUTTON TGHTRP

## (undated) DEVICE — DRAPE C-ARM UNIVERSAL

## (undated) DEVICE — SINGLE-USE DIGITAL FLEXIBLE URETEROSCOPE: Brand: LITHOVUE™ ELITE

## (undated) DEVICE — NEEDLE SPINAL 18X3-1/2 PINK.

## (undated) DEVICE — POLAR CARE CUBE COOLING UNIT

## (undated) DEVICE — SUTURE TIGERLOOP #2

## (undated) DEVICE — URETERAL ACCESS SHEATH SET: Brand: NAVIGATOR HD

## (undated) DEVICE — SUTURE VICRYL 2-0 FS-1

## (undated) NOTE — LETTER
[facility]  Administration of Anesthesia  IRicardo agree to be cared for by a physician anesthesiologist alone and/or with a nurse anesthetist, who is specially trained to monitor me and give me medicine to put me to sleep or keep me comfortable during my procedure    I understand that my anesthesiologist and/or anesthetist is not an employee or agent of [facility] or [employer]. He or she works for [facility]    As the patient asking for anesthesia services, I agree to:  Allow the anesthesiologist (anesthesia doctor) to give me medicine and do additional procedures as necessary. Some examples are: Starting or using an “IV” to give me medicine, fluids or blood during my procedure, and having a breathing tube placed to help me breathe when I’m asleep (intubation). In the event that my heart stops working properly, I understand that my anesthesiologist will make every effort to sustain my life, unless otherwise directed by [facility] Do Not Resuscitate documents.  Tell my anesthesia doctor before my procedure:  If I am pregnant.  The last time that I ate or drank.  iii. All of the medicines I take (including prescriptions, herbal supplements, and pills I can buy without a prescription (including street drugs/illegal medications). Failure to inform my anesthesiologist about these medicines may increase my risk of anesthetic complications.  iv.If I am allergic to anything or have had a reaction to anesthesia before.  I understand how the anesthesia medicine will help me (benefits).  I understand that with any type of anesthesia medicine there are risks:  The most common risks are: nausea, vomiting, sore throat, muscle soreness, damage to my eyes, mouth, or teeth (from breathing tube placement).  Rare risks include: remembering what happened during my procedure, allergic reactions to medications, injury to my airway, heart, lungs, vision, nerves, or  muscles and in extremely rare instances death.  My doctor has explained to me other choices available to me for my care (alternatives).  Pregnant Patients (“epidural”):  I understand that the risks of having an epidural (medicine given into my back to help control pain during labor), include itching, low blood pressure, difficulty urinating, headache or slowing of the baby’s heart. Very rare risks include infection, bleeding, seizure, irregular heart rhythms and nerve injury.  Regional Anesthesia (“spinal”, “epidural”, & “nerve blocks”):  I understand that rare but potential complications include headache, bleeding, infection, seizure, irregular heart rhythms, and nerve injury.    _____________________________________________________________________________  Patient (or Representative) Signature/Relationship to Patient  Date   Time    _____________________________________________________________________________   Name (if used)    Language/Organization   Time    _____________________________________________________________________________  Nurse Anesthetist Signature     Date   Time  _____________________________________________________________________________  Anesthesiologist Signature     Date   Time  I have discussed the procedure and information above with the patient (or patient’s representative) and answered their questions. The patient or their representative has agreed to have anesthesia services.    _____________________________________________________________________________  Witness        Date   Time  I have verified that the signature is that of the patient or patient’s representative, and that it was signed before the procedure  Patient Name: Ricardo Flores     : 6/3/1965                 Printed: 2025 at 5:04 PM    Medical Record #: [id number]                                            Page 1 of 1  ----------ANESTHESIA CONSENT----------

## (undated) NOTE — LETTER
[facility] [address], [place], [place]    Authorization for Surgical Operation and Procedure                               I hereby authorize Ken Cole MD, my physician and his/her assistants (if applicable), which may include medical students, residents, and/or fellows, to perform the following surgical operation/ procedure and administer such anesthesia as may be determined necessary by my physician: Operation/Procedure name (s) CYSTOSCOPY RIGHT RETROGRADE PYELOGRAM, POSSIBLE URETEROSCOPY, POSSIBLE LASER LITHOTRIPSY, RIGHT URETERAL STENT INSERTION on Ricardodaniel Flores   2.   I recognize that during the surgical operation/procedure, unforeseen conditions may necessitate additional or different procedures than those listed above.  I, therefore, further authorize and request that the above-named surgeon, assistants, or designees perform such procedures as are, in their judgment, necessary and desirable.    3.   My surgeon/physician has discussed prior to my surgery the potential benefits, risks and side effects of this procedure; the likelihood of achieving goals; and potential problems that might occur during recuperation.  They also discussed reasonable alternatives to the procedure, including risks, benefits, and side effects related to the alternatives and risks related to not receiving this procedure.  I have had all my questions answered and I acknowledge that no guarantee has been made as to the result that may be obtained.    4.   Should the need arise during my operation/procedure, which includes change of level of care prior to discharge, I also consent to the administration of blood and/or blood products.  Further, I understand that despite careful testing and screening of blood or blood products by collecting agencies, I may still be subject to ill effects as a result of receiving a blood transfusion and/or blood products.  The following are some, but not all, of the potential  risks that can occur: fever and allergic reactions, hemolytic reactions, transmission of diseases such as Hepatitis, AIDS and Cytomegalovirus (CMV) and fluid overload.  In the event that I wish to have an autologous transfusion of my own blood, or a directed donor transfusion, I will discuss this with my physician.  Check only if Refusing Blood or Blood Products  I understand refusal of blood or blood products as deemed necessary by my physician may have serious consequences to my condition to include possible death. I hereby assume responsibility for my refusal and release the hospital, its personnel, and my physicians from any responsibility for the consequences of my refusal.    o  Refuse   5.   I authorize the use of any specimen, organs, tissues, body parts or foreign objects that may be removed from my body during the operation/procedure for diagnosis, research or teaching purposes and their subsequent disposal by hospital authorities.  I also authorize the release of specimen test results and/or written reports to my treating physician on the hospital medical staff or other referring or consulting physicians involved in my care, at the discretion of the Pathologist or my treating physician.    6.   I consent to the photographing or videotaping of the operations or procedures to be performed, including appropriate portions of my body for medical, scientific, or educational purposes, provided my identity is not revealed by the pictures or by descriptive texts accompanying them.  If the procedure has been photographed/videotaped, the surgeon will obtain the original picture, image, videotape or CD.  The hospital will not be responsible for storage, release or maintenance of the picture, image, tape or CD.    7.   I consent to the presence of a  or observers in the operating room as deemed necessary by my physician or their designees.    8.   I recognize that in the event my procedure results in  extended X-Ray/fluoroscopy time, I may develop a skin reaction.    9. If I have a Do Not Attempt Resuscitation (DNAR) order in place, that status will be suspended while in the operating room, procedural suite, and during the recovery period unless otherwise explicitly stated by me (or a person authorized to consent on my behalf). The surgeon or my attending physician will determine when the applicable recovery period ends for purposes of reinstating the DNAR order.  10. Patients having a sterilization procedure: I understand that if the procedure is successful the results will be permanent and it will therefore be impossible for me to inseminate, conceive, or bear children.  I also understand that the procedure is intended to result in sterility, although the result has not been guaranteed.   11. I acknowledge that my physician has explained sedation/analgesia administration to me including the risk and benefits I consent to the administration of sedation/analgesia as may be necessary or desirable in the judgment of my physician.    I CERTIFY THAT I HAVE READ AND FULLY UNDERSTAND THE ABOVE CONSENT TO OPERATION and/or OTHER PROCEDURE.     ____________________________________  _________________________________        ______________________________  Signature of Patient    Signature of Responsible Person                Printed Name of Responsible Person                                      ____________________________________  _____________________________                ________________________________  Signature of Witness        Date  Time         Relationship to Patient    STATEMENT OF PHYSICIAN My signature below affirms that prior to the time of the procedure; I have explained to the patient and/or his/her legal representative, the risks and benefits involved in the proposed treatment and any reasonable alternative to the proposed treatment. I have also explained the risks and benefits involved in refusal of the  proposed treatment and alternatives to the proposed treatment and have answered the patient's questions. If I have a significant financial interest in a co-management agreement or a significant financial interest in any product or implant, or other significant relationship used in this procedure/surgery, I have disclosed this and had a discussion with my patient.     _____________________________________________________              _____________________________  (Signature of Physician)                                                                                         (Date)                                   (Time)  Patient Name: Ricardo Cyrfermin      : 6/3/1965      Printed: 2025     Medical Record #: [id number]                                      Page 1 of 1

## (undated) NOTE — LETTER
[place] [address]  Authorization for Invasive Procedures  Date: 4/19/2025           Time: 1130    I hereby authorize Dr. FRANCISCO Cole, my physician and his/her assistants (if applicable), which may include medical students, residents, and/or fellows, to perform the following surgical operation/ procedure and administer such anesthesia as may be determined necessary by my physician: Cystoscopy, right ureteroscopy, holmium laser lithotripsy,  right stent placement on Ricardo Flores  2.   I recognize that during the surgical operation/procedure, unforeseen conditions may necessitate additional or different procedures than those listed above.  I, therefore, further authorize and request that the above-named surgeon, assistants, or designees perform such procedures as are, in their judgment, necessary and desirable.    3.   My surgeon/physician has discussed prior to my surgery the potential benefits, risks and side effects of this procedure; the likelihood of achieving goals; and potential problems that might occur during recuperation.  They also discussed reasonable alternatives to the procedure, including risks, benefits, and side effects related to the alternatives and risks related to not receiving this procedure.  I have had all my questions answered and I acknowledge that no guarantee has been made as to the result that may be obtained.    4.   Should the need arise during my operation/procedure, which includes change of level of care prior to discharge, I also consent to the administration of blood and/or blood products.  Further, I understand that despite careful testing and screening of blood or blood products by collecting agencies, I may still be subject to ill effects as a result of receiving a blood transfusion and/or blood products.  The following are some, but not all, of the potential risks that can occur: fever and allergic reactions, hemolytic reactions, transmission of  diseases such as Hepatitis, AIDS and Cytomegalovirus (CMV) and fluid overload.  In the event that I wish to have an autologous transfusion of my own blood, or a directed donor transfusion, I will discuss this with my physician.   Check only if Refusing Blood or Blood Products  I understand refusal of blood or blood products as deemed necessary by my physician may have serious consequences to my condition to include possible death. I hereby assume responsibility for my refusal and release the hospital, its personnel, and my physicians from any responsibility for the consequences of my refusal.         o  Refuse         5.   I authorize the use of any specimen, organs, tissues, body parts or foreign objects that may be removed from my body during the operation/procedure for diagnosis, research or teaching purposes and their subsequent disposal by hospital authorities.  I also authorize the release of specimen test results and/or written reports to my treating physician on the hospital medical staff or other referring or consulting physicians involved in my care, at the discretion of the Pathologist or my treating physician.    6.   I consent to the photographing or videotaping of the operations or procedures to be performed, including appropriate portions of my body for medical, scientific, or educational purposes, provided my identity is not revealed by the pictures or by descriptive texts accompanying them.  If the procedure has been photographed/videotaped, the surgeon will obtain the original picture, image, videotape or CD.  The hospital will not be responsible for storage, release or maintenance of the picture, image, tape or CD.    7.   I consent to the presence of a  or observers in the operating room as deemed necessary by my physician or their designees.    8.   I recognize that in the event my procedure results in extended X-Ray/fluoroscopy time, I may develop a skin reaction.    9. If I  have a Do Not Attempt Resuscitation (DNAR) order in place, that status will be suspended while in the operating room, procedural suite, and during the recovery period unless otherwise explicitly stated by me (or a person authorized to consent on my behalf). The surgeon or my attending physician will determine when the applicable recovery period ends for purposes of reinstating the DNAR order.  10. Patients having a sterilization procedure: I understand that if the procedure is successful the results will be permanent and it will therefore be impossible for me to inseminate, conceive, or bear children.  I also understand that the procedure is intended to result in sterility, although the result has not been guaranteed.   11. I acknowledge that my physician has explained sedation/analgesia administration to me including the risk and benefits I consent to the administration of sedation/analgesia as may be necessary or desirable in the judgment of my physician.    I CERTIFY THAT I HAVE READ AND FULLY UNDERSTAND THE ABOVE CONSENT TO OPERATION and/or OTHER PROCEDURE.        ____________________________________       _________________________________      ______________________________  Signature of Patient         Signature of Responsible Person        Printed Name of Responsible Person        ____________________________________      _________________________________      ______________________________       Signature of Witness          Relationship to Patient                       Date                                       Time  Patient Name: Ricardo Flores  : 6/3/1965    Reviewed: 2024   Printed: 2025  Medical Record #: [id number] Page 1 of 2             STATEMENT OF PHYSICIAN My signature below affirms that prior to the time of the procedure; I have explained to the patient and/or his/her legal representative, the risks and benefits involved in the proposed treatment and any reasonable alternative  to the proposed treatment. I have also explained the risks and benefits involved in refusal of the proposed treatment and alternatives to the proposed treatment and have answered the patient's questions. If I have a significant financial interest in a co-management agreement or a significant financial interest in any product or implant, or other significant relationship used in this procedure/surgery, I have disclosed this and had a discussion with my patient.     _______________________________________________________________ _____________________________  (Signature of Physician)                                                                                         (Date)                                   (Time)  Patient Name: Ricardo Flores  : 6/3/1965    Reviewed: 2024   Printed: 2025  Medical Record #: [id number] Page 2 of 2

## (undated) NOTE — LETTER
Patient Name: Dianna Veloz  YOB: 1965          MRN number:  [id number]  Date:  7/25/2019  Referring Physician: Jason Mcneil        PELVIC FLOOR EVALUATION:   Referring Physician: Dr. Magdiel Ngo  Diagnosis: testicular pain; inflammatory state of Current Pain: 0/10  At Worst: 4/10  At Best: 0/10  Quality: intermittent and sharp  Aggravating factors: stress, orgasm (after)  Alleviating factors: orgasm (during), breathing     Patient describes prior level of function not limitation.     Occupation: N History of Trauma yes     Night Pain, Unexplained Weight Loss, Fever or Chills no     Lower Extremity Neurological Deficit yes L LE   Vision Changes/Double Vision no     Headaches yes migraines   Chest Pain or Palpitations, SOB no     Dizziness, weakness, Abdominals: 4/5          FLEXIBILITY/PALPATION     Muscle Left Right Comments   Illiopsoas Mild restriction Mild restriction     Hamstrings Mild restriction Mild restriction     Piriformis Mild restriction Mild restriction     Adductors Mild restriction Mi Patient was instructed in and issued a HEP:  1. Male pelvic anatomy  2.toileting mechanics  3.bladder normatives  4.  Diaphragmatic breathing     Charges: PT Eval x1, 1 self care, 1NM   Total Treatment Time: 90 min Total timed treatment: 30 min     Thank yo

## (undated) NOTE — LETTER
07/14/25        Ricardo Flores  [address] [address]      Dear Ricardo,    Our records indicate that you have outstanding lab work and or testing that was ordered for you and has not yet been completed:  US KIDNEYS     To provide you with the best possible care, please complete these orders at your earliest convenience. If you have recently completed these orders please disregard this letter.     If you have any questions please call the office at [phone].    Thank you,       Urology Staff

## (undated) NOTE — LETTER
[facility]  Administration of Anesthesia  IRicardo agree to be cared for by a physician anesthesiologist alone and/or with a nurse anesthetist, who is specially trained to monitor me and give me medicine to put me to sleep or keep me comfortable during my procedure    I understand that my anesthesiologist and/or anesthetist is not an employee or agent of [facility] or [employer]. He or she works for [facility]    As the patient asking for anesthesia services, I agree to:  Allow the anesthesiologist (anesthesia doctor) to give me medicine and do additional procedures as necessary. Some examples are: Starting or using an “IV” to give me medicine, fluids or blood during my procedure, and having a breathing tube placed to help me breathe when I’m asleep (intubation). In the event that my heart stops working properly, I understand that my anesthesiologist will make every effort to sustain my life, unless otherwise directed by [facility] Do Not Resuscitate documents.  Tell my anesthesia doctor before my procedure:  If I am pregnant.  The last time that I ate or drank.  iii. All of the medicines I take (including prescriptions, herbal supplements, and pills I can buy without a prescription (including street drugs/illegal medications). Failure to inform my anesthesiologist about these medicines may increase my risk of anesthetic complications.  iv.If I am allergic to anything or have had a reaction to anesthesia before.  I understand how the anesthesia medicine will help me (benefits).  I understand that with any type of anesthesia medicine there are risks:  The most common risks are: nausea, vomiting, sore throat, muscle soreness, damage to my eyes, mouth, or teeth (from breathing tube placement).  Rare risks include: remembering what happened during my procedure, allergic reactions to medications, injury to my airway, heart, lungs, vision, nerves, or  muscles and in extremely rare instances death.  My doctor has explained to me other choices available to me for my care (alternatives).  Pregnant Patients (“epidural”):  I understand that the risks of having an epidural (medicine given into my back to help control pain during labor), include itching, low blood pressure, difficulty urinating, headache or slowing of the baby’s heart. Very rare risks include infection, bleeding, seizure, irregular heart rhythms and nerve injury.  Regional Anesthesia (“spinal”, “epidural”, & “nerve blocks”):  I understand that rare but potential complications include headache, bleeding, infection, seizure, irregular heart rhythms, and nerve injury.    _____________________________________________________________________________  Patient (or Representative) Signature/Relationship to Patient  Date   Time    _____________________________________________________________________________   Name (if used)    Language/Organization   Time    _____________________________________________________________________________  Nurse Anesthetist Signature     Date   Time  _____________________________________________________________________________  Anesthesiologist Signature     Date   Time  I have discussed the procedure and information above with the patient (or patient’s representative) and answered their questions. The patient or their representative has agreed to have anesthesia services.    _____________________________________________________________________________  Witness        Date   Time  I have verified that the signature is that of the patient or patient’s representative, and that it was signed before the procedure  Patient Name: Ricardo Flores     : 6/3/1965                 Printed: 2025 at 5:27 PM    Medical Record #: [id number]                                            Page 1 of 1  ----------ANESTHESIA CONSENT----------